# Patient Record
Sex: MALE | Race: BLACK OR AFRICAN AMERICAN | NOT HISPANIC OR LATINO | Employment: FULL TIME | URBAN - METROPOLITAN AREA
[De-identification: names, ages, dates, MRNs, and addresses within clinical notes are randomized per-mention and may not be internally consistent; named-entity substitution may affect disease eponyms.]

---

## 2024-02-08 ENCOUNTER — CONSULT (OUTPATIENT)
Dept: RADIATION ONCOLOGY | Facility: HOSPITAL | Age: 74
End: 2024-02-08
Payer: MEDICARE

## 2024-02-08 VITALS
OXYGEN SATURATION: 97 % | DIASTOLIC BLOOD PRESSURE: 79 MMHG | SYSTOLIC BLOOD PRESSURE: 140 MMHG | RESPIRATION RATE: 20 BRPM | HEART RATE: 78 BPM | WEIGHT: 176.59 LBS | TEMPERATURE: 98.5 F

## 2024-02-08 DIAGNOSIS — C61 PROSTATE CANCER: Primary | ICD-10-CM

## 2024-02-08 PROCEDURE — G0463 HOSPITAL OUTPT CLINIC VISIT: HCPCS | Performed by: RADIOLOGY

## 2024-02-08 NOTE — LETTER
February 9, 2024     Sherri Mehta MD  [address]  [place] [address]    Patient: Fausto Zaman   YOB: 1950   Date of Visit: 2/8/2024     Dear Sherri Mehta MD:       Thank you for referring Fausto Zaman to me for evaluation. Below are the relevant portions of my assessment and plan of care.  It seems as though the family is between watchful waiting and pursuing treatment.    If you have questions, please do not hesitate to call me. I look forward to following Mr. Zaman along with you.         Sincerely,        Aldo Mejia MD        CC: No Recipients    Aldo Mejia MD  02/09/24 0917  Sign when Signing Visit       New Patient Office Visit      Encounter Date: 02/08/2024   Patient Name: Fausto Zaman  YOB: 1950   Medical Record Number: [id number]   Primary Diagnosis: Prostate cancer [C61]         Chief Complaint:    Chief Complaint   Patient presents with   • Consult   • Prostate Cancer       History of Present Illness: Fausto Zaman is a 73-year-old gentleman with intermediate risk prostate cancer who is seen in consultation regarding radiotherapy as a component of definitive management.  Mr. Zaman was noted to have a elevated PSA and was recommended to undergo biopsy of the prostate.  Biopsy of the prostate performed on 12/18/2023 revealed prostatic adenocarcinoma, [place] score 3+4 = 7 in cores from the right mid, right lateral mid, right lateral base, left lateral apex, left lateral mid and left lateral base.  Cores from the left apex, right lateral apex and right base revealed adenocarcinoma Phyllis score 3+3 = 6.  Mr. Zaman reports good urinary function with some occasional nocturia x 3; AUA SS today is 9.  He reports normal bowel movements with no issues.    Subjective      AUA/IPSS: 9       Review of Systems: Review of Systems   Constitutional:  Negative for appetite change, fatigue and unexpected weight change.   HENT:  Negative  for hearing loss, sore throat, trouble swallowing and voice change.    Eyes:  Positive for visual disturbance (Ongoing, does not wear his glasses.).   Respiratory:  Negative for cough, chest tightness, shortness of breath and wheezing.    Cardiovascular:  Negative for chest pain, palpitations and leg swelling.   Gastrointestinal:  Positive for constipation (Occasionally, relieved with diet.). Negative for abdominal distention, abdominal pain, anal bleeding, blood in stool, diarrhea, nausea and vomiting.        Colonoscopy approximately 4 years ago at [facility].     Genitourinary:  Positive for frequency (Ongoing) and urgency (Ongoing). Negative for difficulty urinating, dysuria and hematuria.        Noc x3-4  Normal urinary stream     Musculoskeletal:  Positive for arthralgias (Occasional right shoulder pain.). Negative for back pain, gait problem and joint swelling.   Skin:  Negative for color change and rash.   Neurological:  Negative for dizziness, weakness and headaches.        States that he fell yesterday, no injury.     Psychiatric/Behavioral:  Negative for self-injury, sleep disturbance and suicidal ideas.        Past Medical History:   Past Medical History:   Diagnosis Date   • Prostate cancer        Past Surgical History:   Past Surgical History:   Procedure Laterality Date   • ABDOMINAL SURGERY      Patient had part of intestines removed in the 70's   • COLONOSCOPY      Approximately 4 years ago at [facility].   • HERNIA REPAIR      x2       Family History:   Family History   Problem Relation Age of Onset   • Cancer Paternal Uncle        Social History:   Social History     Socioeconomic History   • Marital status:    Tobacco Use   • Smoking status: Never   Substance and Sexual Activity   • Alcohol use: Never   • Drug use: Never       Medications:     Current Outpatient Medications:   •  DONEPEZIL HCL PO, Take 1 tablet by mouth Daily., Disp: , Rfl:   •  vitamin D3 125 MCG (5000  UT) capsule capsule, Take 2 capsules by mouth Daily., Disp: , Rfl:     Allergies:   No Known Allergies    Pain: (on a scale of 0-10)   Pain Score    02/08/24 1531   PainSc: 0-No pain       Fausto Zaman reports a pain score of 0.  Given his pain assessment as noted, treatment options were discussed and the following options were decided upon as a follow-up plan to address the patient's pain: continuation of current treatment plan for pain.     Advanced Care Plan: N   Quality of Life: 100 - Full Activity    Objective     Physical Exam:   Vital Signs:   Vitals:    02/08/24 1531   BP: 140/79   Pulse: 78   Resp: 20   Temp: 98.5 °F (36.9 °C)   TempSrc: Temporal   SpO2: 97%   Weight: 80.1 kg (176 lb 9.4 oz)   PainSc: 0-No pain     There is no height or weight on file to calculate BMI.     Physical Exam  Constitutional:       General: He is not in acute distress.     Appearance: He is not toxic-appearing.   HENT:      Head: Normocephalic and atraumatic.   Pulmonary:      Effort: Pulmonary effort is normal. No respiratory distress.   Skin:     General: Skin is warm and dry.      Coloration: Skin is not jaundiced.      Findings: No lesion.   Neurological:      General: No focal deficit present.      Mental Status: He is alert.      Cranial Nerves: No cranial nerve deficit.      Gait: Gait normal.   Psychiatric:         Mood and Affect: Mood normal.         Behavior: Behavior normal.         Judgment: Judgment normal.         Results:   Radiographs: I personally reviewed the results of the CT scan of the abdomen and pelvis performed on 2/2/2022 in addition to the bone scan performed on the same date.  This reveals no evidence of metastatic disease.    Pathology: I personally reviewed the pathology report from the procedure performed on 12/18/2023.  The pertinent findings are as above in HPI.        Assessment / Plan      Assessment/Plan:   Fausto Zaman is a 73-year-old gentleman with cT1c cN0c M0 adenocarcinoma the  prostate Phyllis 3+4 = 7, PSA 7.  He has no clinical or radiographic evidence of regional or distant metastatic disease.  ECOG 0    I discussed the clinical, radiographic and pathologic findings to date with Mr. Zaman and his family.  I explained the role of radiotherapy in the definitive management of intermediate risk prostate cancer, outlining the rationale for this approach.  I discussed the logistics, the risks, benefits and alternatives to external beam radiotherapy.  I did explain that in some cases of intermediate risk prostate cancer, stereotactic body radiotherapy can be delivered but this treatment should be reserved for patients with low-grade, early stage disease with excellent urinary function.  I offered moderately hypofractionated external beam radiotherapy and a total of 28 treatments, outlining the success rate and toxicity profile associated with this approach.  This would be delivered with androgen deprivation therapy as directed by Dr. Mehta.  I explained that hydrogel dissection with fiducial marker placement would be performed before initiating treatment.  We additionally discussed watchful waiting and active surveillance.  Mr. Zaman will consider his options and contact me regarding his decision.        Aldo Mejia MD  Radiation Oncology  [facility]    This document has been signed by Aldo Mejia MD on February 9, 2024 09:07 EST

## 2024-02-08 NOTE — PROGRESS NOTES
New Patient Office Visit      Encounter Date: 02/08/2024   Patient Name: Fausto Zaman  YOB: 1950   Medical Record Number: [id number]   Primary Diagnosis: Prostate cancer [C61]         Chief Complaint:    Chief Complaint   Patient presents with    Consult    Prostate Cancer       History of Present Illness: Fausto Zaman is a 73-year-old gentleman with intermediate risk prostate cancer who is seen in consultation regarding radiotherapy as a component of definitive management.  Mr. Zaman was noted to have a elevated PSA and was recommended to undergo biopsy of the prostate.  Biopsy of the prostate performed on 12/18/2023 revealed prostatic adenocarcinoma, [place] score 3+4 = 7 in cores from the right mid, right lateral mid, right lateral base, left lateral apex, left lateral mid and left lateral base.  Cores from the left apex, right lateral apex and right base revealed adenocarcinoma Phyllis score 3+3 = 6.  Mr. Zaman reports good urinary function with some occasional nocturia x 3; AUA SS today is 9.  He reports normal bowel movements with no issues.    Subjective      AUA/IPSS: 9       Review of Systems: Review of Systems   Constitutional:  Negative for appetite change, fatigue and unexpected weight change.   HENT:  Negative for hearing loss, sore throat, trouble swallowing and voice change.    Eyes:  Positive for visual disturbance (Ongoing, does not wear his glasses.).   Respiratory:  Negative for cough, chest tightness, shortness of breath and wheezing.    Cardiovascular:  Negative for chest pain, palpitations and leg swelling.   Gastrointestinal:  Positive for constipation (Occasionally, relieved with diet.). Negative for abdominal distention, abdominal pain, anal bleeding, blood in stool, diarrhea, nausea and vomiting.        Colonoscopy approximately 4 years ago at [facility].     Genitourinary:  Positive for frequency (Ongoing) and urgency (Ongoing). Negative for difficulty  urinating, dysuria and hematuria.        Noc x3-4  Normal urinary stream     Musculoskeletal:  Positive for arthralgias (Occasional right shoulder pain.). Negative for back pain, gait problem and joint swelling.   Skin:  Negative for color change and rash.   Neurological:  Negative for dizziness, weakness and headaches.        States that he fell yesterday, no injury.     Psychiatric/Behavioral:  Negative for self-injury, sleep disturbance and suicidal ideas.        Past Medical History:   Past Medical History:   Diagnosis Date    Prostate cancer        Past Surgical History:   Past Surgical History:   Procedure Laterality Date    ABDOMINAL SURGERY      Patient had part of intestines removed in the 70's    COLONOSCOPY      Approximately 4 years ago at [facility].    HERNIA REPAIR      x2       Family History:   Family History   Problem Relation Age of Onset    Cancer Paternal Uncle        Social History:   Social History     Socioeconomic History    Marital status:    Tobacco Use    Smoking status: Never   Substance and Sexual Activity    Alcohol use: Never    Drug use: Never       Medications:     Current Outpatient Medications:     DONEPEZIL HCL PO, Take 1 tablet by mouth Daily., Disp: , Rfl:     vitamin D3 125 MCG (5000 UT) capsule capsule, Take 2 capsules by mouth Daily., Disp: , Rfl:     Allergies:   No Known Allergies    Pain: (on a scale of 0-10)   Pain Score    02/08/24 1531   PainSc: 0-No pain       Fausto Zaman reports a pain score of 0.  Given his pain assessment as noted, treatment options were discussed and the following options were decided upon as a follow-up plan to address the patient's pain: continuation of current treatment plan for pain.     Advanced Care Plan: N   Quality of Life: 100 - Full Activity    Objective     Physical Exam:   Vital Signs:   Vitals:    02/08/24 1531   BP: 140/79   Pulse: 78   Resp: 20   Temp: 98.5 °F (36.9 °C)   TempSrc: Temporal   SpO2: 97%    Weight: 80.1 kg (176 lb 9.4 oz)   PainSc: 0-No pain     There is no height or weight on file to calculate BMI.     Physical Exam  Constitutional:       General: He is not in acute distress.     Appearance: He is not toxic-appearing.   HENT:      Head: Normocephalic and atraumatic.   Pulmonary:      Effort: Pulmonary effort is normal. No respiratory distress.   Skin:     General: Skin is warm and dry.      Coloration: Skin is not jaundiced.      Findings: No lesion.   Neurological:      General: No focal deficit present.      Mental Status: He is alert.      Cranial Nerves: No cranial nerve deficit.      Gait: Gait normal.   Psychiatric:         Mood and Affect: Mood normal.         Behavior: Behavior normal.         Judgment: Judgment normal.         Results:   Radiographs: I personally reviewed the results of the CT scan of the abdomen and pelvis performed on 2/2/2022 in addition to the bone scan performed on the same date.  This reveals no evidence of metastatic disease.    Pathology: I personally reviewed the pathology report from the procedure performed on 12/18/2023.  The pertinent findings are as above in HPI.        Assessment / Plan      Assessment/Plan:   Fausto Zaman is a 73-year-old gentleman with cT1c cN0c M0 adenocarcinoma the prostate Phyllis 3+4 = 7, PSA 7.  He has no clinical or radiographic evidence of regional or distant metastatic disease.  ECOG 0    I discussed the clinical, radiographic and pathologic findings to date with Mr. Zaman and his family.  I explained the role of radiotherapy in the definitive management of intermediate risk prostate cancer, outlining the rationale for this approach.  I discussed the logistics, the risks, benefits and alternatives to external beam radiotherapy.  I did explain that in some cases of intermediate risk prostate cancer, stereotactic body radiotherapy can be delivered but this treatment should be reserved for patients with low-grade, early stage  disease with excellent urinary function.  I offered moderately hypofractionated external beam radiotherapy and a total of 28 treatments, outlining the success rate and toxicity profile associated with this approach.  This would be delivered with androgen deprivation therapy as directed by Dr. Mehta.  I explained that hydrogel dissection with fiducial marker placement would be performed before initiating treatment.  We additionally discussed watchful waiting and active surveillance.  Mr. Zaman will consider his options and contact me regarding his decision.        Aldo Mejia MD  Radiation Oncology  [facility]    This document has been signed by Aldo Mejia MD on February 9, 2024 09:07 EST

## 2024-05-17 ENCOUNTER — OFFICE VISIT (OUTPATIENT)
Dept: RADIATION ONCOLOGY | Facility: HOSPITAL | Age: 74
End: 2024-05-17
Payer: MEDICARE

## 2024-05-17 VITALS
SYSTOLIC BLOOD PRESSURE: 139 MMHG | TEMPERATURE: 97.8 F | DIASTOLIC BLOOD PRESSURE: 94 MMHG | WEIGHT: 167.55 LBS | OXYGEN SATURATION: 100 % | RESPIRATION RATE: 16 BRPM | HEART RATE: 73 BPM

## 2024-05-17 DIAGNOSIS — C61 PROSTATE CANCER: Primary | ICD-10-CM

## 2024-05-17 PROCEDURE — G0463 HOSPITAL OUTPT CLINIC VISIT: HCPCS | Performed by: RADIOLOGY

## 2024-05-17 NOTE — PROGRESS NOTES
Follow Up Office Visit      Encounter Date: 05/17/2024   Patient Name: Fausto Zaman  YOB: 1950   Medical Record Number: [id number]   Primary Diagnosis: Prostate cancer [C61]           Chief Complaint:    Chief Complaint   Patient presents with    Follow-up    Prostate Cancer       History of Present Illness: Fausto Zaman returns for evaluation regarding treatment for prostate cancer.  He has decided to undergo external beam radiotherapy.  He reports no changes since last evaluated.    Subjective      Review of Systems: Review of Systems   Constitutional:  Negative for appetite change and fatigue.   Respiratory:  Negative for cough and shortness of breath.    Gastrointestinal:  Positive for constipation (occasional, controlled with diet). Negative for blood in stool, diarrhea, nausea and rectal pain.        Colonoscopy 2019     Genitourinary:  Positive for frequency (occasional) and urgency (occasional). Negative for difficulty urinating, dysuria and hematuria.        Nocturia x 1   Musculoskeletal:  Negative for arthralgias and back pain.   Neurological:  Negative for dizziness and headaches.   Psychiatric/Behavioral:  Negative for sleep disturbance (just occasional).        The following portions of the patient's history were reviewed and updated as appropriate: allergies, current medications, past family history, past medical history, past social history, past surgical history and problem list.    Medications:     Current Outpatient Medications:     DONEPEZIL HCL PO, Take 1 tablet by mouth Daily., Disp: , Rfl:     vitamin D3 125 MCG (5000 UT) capsule capsule, Take 2 capsules by mouth Daily., Disp: , Rfl:     Allergies:   No Known Allergies    ECOG: (0) Fully active, able to carry on all predisease performance without restriction  Quality of Life: 100 - Full Activity     Objective     Physical Exam:   Vital Signs:   Vitals:    05/17/24 1537   BP: 139/94   Pulse: 73   Resp: 16   Temp:  97.8 °F (36.6 °C)   TempSrc: Temporal   SpO2: 100%   Weight: 76 kg (167 lb 8.8 oz)   PainSc: 0-No pain     There is no height or weight on file to calculate BMI.     Physical Exam  Constitutional:       General: He is not in acute distress.     Appearance: He is normal weight. He is not toxic-appearing.   HENT:      Head: Normocephalic and atraumatic.   Pulmonary:      Effort: Pulmonary effort is normal. No respiratory distress.   Skin:     General: Skin is warm and dry.   Neurological:      General: No focal deficit present.      Mental Status: He is alert.      Cranial Nerves: No cranial nerve deficit.      Gait: Gait normal.   Psychiatric:         Mood and Affect: Mood normal.         Behavior: Behavior normal.         Judgment: Judgment normal.       Fausto Zaman reports a pain score of 0.  Given his pain assessment as noted, treatment options were discussed and the following options were decided upon as a follow-up plan to address the patient's pain: continuation of current treatment plan for pain.       Labs: Recent PSA is 9 ng/mL    Assessment / Plan      Assessment/Plan:   Fausto Zaman is a 73-year-old gentleman with cT1c cN0c M0 adenocarcinoma the prostate Phyllis 3+4 = 7, PSA 7. He has no clinical or radiographic evidence of regional or distant metastatic disease. ECOG 0    We discussed the logistics regarding the delivery of external beam radiotherapy.  We additionally discussed the expected toxicity profile of external beam radiotherapy.  Mr. Zaman is agreeable to hydrogel dissection with fiducial marker placement before undergoing external beam radiotherapy.        Aldo Mejia MD  Radiation Oncology  [facility]    This document has been signed by Aldo Mejia MD on May 17, 2024 16:10 EDT

## 2024-05-30 ENCOUNTER — PREP FOR SURGERY (OUTPATIENT)
Dept: OTHER | Facility: HOSPITAL | Age: 74
End: 2024-05-30
Payer: MEDICARE

## 2024-05-30 DIAGNOSIS — C61 PROSTATE CANCER: Primary | ICD-10-CM

## 2024-06-03 ENCOUNTER — HOSPITAL ENCOUNTER (OUTPATIENT)
Dept: RADIATION ONCOLOGY | Facility: HOSPITAL | Age: 74
Setting detail: RADIATION/ONCOLOGY SERIES
End: 2024-06-03
Payer: MEDICARE

## 2024-06-06 ENCOUNTER — EDUCATION (OUTPATIENT)
Dept: RADIATION ONCOLOGY | Facility: HOSPITAL | Age: 74
End: 2024-06-06
Payer: MEDICARE

## 2024-06-06 RX ORDER — CIPROFLOXACIN 500 MG/1
500 TABLET, FILM COATED ORAL 2 TIMES DAILY
Qty: 6 TABLET | Refills: 0 | Status: SHIPPED | OUTPATIENT
Start: 2024-06-11

## 2024-06-06 NOTE — PROGRESS NOTES
Mr. Zaman was set up for education for his upcoming procedure with Dr. Mejia.  Patient is to be scheduled for hydrogel dissection with fiducial marker placement.  I spoke to patients daughter, Jennifer and she requested that education be sent to her via email  Email sent to Jennifer, May 31st 2024.  Written instructions were given to patient and family regarding prep for procedure and post op care.  I contacted Jennifer today, she confirms that she received the email and denies any questions or concerns.  Instructed to reach out to our office for any questions or concerns.  Prescription for Cipro called into patients preferred pharmacy.

## 2024-06-07 RX ORDER — MEMANTINE HYDROCHLORIDE 10 MG/1
1 TABLET ORAL DAILY
COMMUNITY
Start: 2024-04-26

## 2024-06-07 NOTE — PRE-PROCEDURE INSTRUCTIONS
PATIENT INSTRUCTED TO BE:    - NPO AFTER MIDNIGHT EXCEPT CAN HAVE CLEAR LIQUIDS 2 HOURS PRIOR TO SURGERY ARRIVAL TIME     - TO HOLD ALL VITAMINS, SUPPLEMENTS, NSAIDS FOR ONE WEEK PRIOR TO THEIR SURGICAL PROCEDURE    - INSTRUCTED PT TO USE SURGICAL SOAP 1 TIME THE NIGHT PRIOR TO SURGERY OR THE AM OF SURGERY.   USE SOAP FROM NECK TO TOES AVOID THEIR FACE, HAIR, AND PRIVATE PARTS. INSTRUCTED NO LOTIONS, JEWELRY, PIERCINGS, OR DEODORANT DAY OF SURGERY        - INSTRUCTED TO TAKE THE FOLLOWING MEDICATIONS THE DAY OF SURGERY:   Cipro, Memantine    PATIENT VERBALIZED UNDERSTANDING

## 2024-06-12 ENCOUNTER — ANESTHESIA (OUTPATIENT)
Dept: PERIOP | Facility: HOSPITAL | Age: 74
End: 2024-06-12
Payer: MEDICARE

## 2024-06-12 ENCOUNTER — HOSPITAL ENCOUNTER (OUTPATIENT)
Facility: HOSPITAL | Age: 74
Setting detail: HOSPITAL OUTPATIENT SURGERY
Discharge: HOME | End: 2024-06-12
Attending: RADIOLOGY | Admitting: RADIOLOGY
Payer: MEDICARE

## 2024-06-12 ENCOUNTER — ANESTHESIA EVENT (OUTPATIENT)
Dept: PERIOP | Facility: HOSPITAL | Age: 74
End: 2024-06-12
Payer: MEDICARE

## 2024-06-12 VITALS
OXYGEN SATURATION: 97 % | DIASTOLIC BLOOD PRESSURE: 99 MMHG | HEIGHT: 72 IN | SYSTOLIC BLOOD PRESSURE: 165 MMHG | TEMPERATURE: 98.1 F | WEIGHT: 166.89 LBS | BODY MASS INDEX: 22.6 KG/M2 | HEART RATE: 67 BPM | RESPIRATION RATE: 18 BRPM

## 2024-06-12 PROCEDURE — 25010000002 ONDANSETRON PER 1 MG: Performed by: NURSE ANESTHETIST, CERTIFIED REGISTERED

## 2024-06-12 PROCEDURE — 25810000003 LACTATED RINGERS PER 1000 ML: Performed by: ANESTHESIOLOGY

## 2024-06-12 PROCEDURE — 25010000002 PROPOFOL 10 MG/ML EMULSION: Performed by: NURSE ANESTHETIST, CERTIFIED REGISTERED

## 2024-06-12 PROCEDURE — A4648 IMPLANTABLE TISSUE MARKER: HCPCS | Performed by: RADIOLOGY

## 2024-06-12 PROCEDURE — C1889 IMPLANT/INSERT DEVICE, NOC: HCPCS | Performed by: RADIOLOGY

## 2024-06-12 PROCEDURE — 25010000002 DEXAMETHASONE PER 1 MG: Performed by: NURSE ANESTHETIST, CERTIFIED REGISTERED

## 2024-06-12 PROCEDURE — 25010000002 FENTANYL CITRATE (PF) 50 MCG/ML SOLUTION: Performed by: NURSE ANESTHETIST, CERTIFIED REGISTERED

## 2024-06-12 DEVICE — INJ GEL PROST/RECTL/SPACR BARRIGEL SYR 3ML: Type: IMPLANTABLE DEVICE | Site: RECTUM | Status: FUNCTIONAL

## 2024-06-12 DEVICE — MARKR FIDUCL KNURL 20CM/NDL 18G 1.0MM GLD: Type: IMPLANTABLE DEVICE | Site: RECTUM | Status: FUNCTIONAL

## 2024-06-12 RX ORDER — OXYCODONE HYDROCHLORIDE 5 MG/1
5 TABLET ORAL
Status: DISCONTINUED | OUTPATIENT
Start: 2024-06-12 | End: 2024-06-12 | Stop reason: HOSPADM

## 2024-06-12 RX ORDER — SODIUM CHLORIDE, SODIUM LACTATE, POTASSIUM CHLORIDE, CALCIUM CHLORIDE 600; 310; 30; 20 MG/100ML; MG/100ML; MG/100ML; MG/100ML
9 INJECTION, SOLUTION INTRAVENOUS CONTINUOUS PRN
Status: DISCONTINUED | OUTPATIENT
Start: 2024-06-12 | End: 2024-06-12 | Stop reason: HOSPADM

## 2024-06-12 RX ORDER — ACETAMINOPHEN 500 MG
1000 TABLET ORAL ONCE
Status: COMPLETED | OUTPATIENT
Start: 2024-06-12 | End: 2024-06-12

## 2024-06-12 RX ORDER — PROPOFOL 10 MG/ML
VIAL (ML) INTRAVENOUS AS NEEDED
Status: DISCONTINUED | OUTPATIENT
Start: 2024-06-12 | End: 2024-06-12 | Stop reason: SURG

## 2024-06-12 RX ORDER — ONDANSETRON 2 MG/ML
INJECTION INTRAMUSCULAR; INTRAVENOUS AS NEEDED
Status: DISCONTINUED | OUTPATIENT
Start: 2024-06-12 | End: 2024-06-12 | Stop reason: SURG

## 2024-06-12 RX ORDER — PROMETHAZINE HYDROCHLORIDE 12.5 MG/1
25 TABLET ORAL ONCE AS NEEDED
Status: DISCONTINUED | OUTPATIENT
Start: 2024-06-12 | End: 2024-06-12 | Stop reason: HOSPADM

## 2024-06-12 RX ORDER — ONDANSETRON 2 MG/ML
4 INJECTION INTRAMUSCULAR; INTRAVENOUS ONCE AS NEEDED
Status: DISCONTINUED | OUTPATIENT
Start: 2024-06-12 | End: 2024-06-12 | Stop reason: HOSPADM

## 2024-06-12 RX ORDER — DEXAMETHASONE SODIUM PHOSPHATE 4 MG/ML
INJECTION, SOLUTION INTRA-ARTICULAR; INTRALESIONAL; INTRAMUSCULAR; INTRAVENOUS; SOFT TISSUE AS NEEDED
Status: DISCONTINUED | OUTPATIENT
Start: 2024-06-12 | End: 2024-06-12 | Stop reason: SURG

## 2024-06-12 RX ORDER — FENTANYL CITRATE 50 UG/ML
INJECTION, SOLUTION INTRAMUSCULAR; INTRAVENOUS AS NEEDED
Status: DISCONTINUED | OUTPATIENT
Start: 2024-06-12 | End: 2024-06-12 | Stop reason: SURG

## 2024-06-12 RX ORDER — PROMETHAZINE HYDROCHLORIDE 25 MG/1
25 SUPPOSITORY RECTAL ONCE AS NEEDED
Status: DISCONTINUED | OUTPATIENT
Start: 2024-06-12 | End: 2024-06-12 | Stop reason: HOSPADM

## 2024-06-12 RX ORDER — LIDOCAINE HYDROCHLORIDE 20 MG/ML
INJECTION, SOLUTION EPIDURAL; INFILTRATION; INTRACAUDAL; PERINEURAL AS NEEDED
Status: DISCONTINUED | OUTPATIENT
Start: 2024-06-12 | End: 2024-06-12 | Stop reason: SURG

## 2024-06-12 RX ADMIN — ACETAMINOPHEN 1000 MG: 500 TABLET ORAL at 13:08

## 2024-06-12 RX ADMIN — SODIUM CHLORIDE, POTASSIUM CHLORIDE, SODIUM LACTATE AND CALCIUM CHLORIDE 9 ML/HR: 600; 310; 30; 20 INJECTION, SOLUTION INTRAVENOUS at 13:08

## 2024-06-12 RX ADMIN — LIDOCAINE HYDROCHLORIDE 100 MG: 20 INJECTION, SOLUTION INTRAVENOUS at 14:26

## 2024-06-12 RX ADMIN — PROPOFOL 100 MG: 10 INJECTION, EMULSION INTRAVENOUS at 14:26

## 2024-06-12 RX ADMIN — FENTANYL CITRATE 50 MCG: 50 INJECTION, SOLUTION INTRAMUSCULAR; INTRAVENOUS at 14:25

## 2024-06-12 RX ADMIN — PROPOFOL 100 MG: 10 INJECTION, EMULSION INTRAVENOUS at 14:28

## 2024-06-12 RX ADMIN — DEXAMETHASONE SODIUM PHOSPHATE 4 MG: 4 INJECTION, SOLUTION INTRAMUSCULAR; INTRAVENOUS at 14:25

## 2024-06-12 RX ADMIN — ONDANSETRON HYDROCHLORIDE 4 MG: 2 SOLUTION INTRAMUSCULAR; INTRAVENOUS at 14:25

## 2024-06-12 NOTE — OP NOTE
Barrigel Procedure Note  Procedure Date: 6/12/2024      Pre/Post Diagnosis: C61 Malignant Neoplasm of Prostate    Rationale/Reason for Procedure: organ at risk protection for radiation therapy delivery      Procedure:  The rectum was voided prior to initiation of the procedure.     The patient was positioned in the dorsal lithotomy position. The transrectal ultrasound probe was positioned to enable guidance of the needle into the space between the prostate and the rectum. Three gold fiducial markers were placed in to the prostate gland. Under transrectal ultrasound guidance, a 30 cm 18-gauge needle was inserted through the rectourethralis muscle and the needle tip advanced into the perirectal fat posterior to the prostate all by using a transperineal approach and with side-fire transrectal ultrasound guidance.  The needle position was confirmed in both the sagittal and axial planes. With the needle tip at mid gland the axial plane was reviewed to confirm the needle was not in the rectal wall (movement of the needle tip without corresponding movement of the rectal wall confirmed perirectal placement). The assembled Barrigel delivery system was then attached to the 18-gauge needle.  Under ultrasound guidance (sagittal plan), a smooth, continuous injection technique was used to dispense the Barrigel hydrogel into the space between the prostate and the rectum (Denonvilliers' fascia and the anterior rectal wall).  A total of three syringes were employed. Optimal visualization of the needle during hydrogel administration was maintained at all times.                        Complications:     No suspected penetration or compromise of the rectal wall occurred.    Aldo Mejia MD / 6/12/2024 / 15:03 EDT  Radiation Oncologist Signature / Date / Time

## 2024-06-12 NOTE — H&P
Radiation Oncology Inpatient Consult       Patient: Fausto Zaman   YOB: 1950   Medical Record Number: [id number]      Date of Consult:  6/12/2024  Primary Diagnosis:  prostate cancer  Cancer Staging: Cancer Staging   No matching staging information was found for the patient.                                                 History of Present Illness:Mr. Zaman was noted to have a elevated PSA and was recommended to undergo biopsy of the prostate.  Biopsy of the prostate performed on 12/18/2023 revealed prostatic adenocarcinoma, Phyllis score 3+4 = 7 in cores from the right mid, right lateral mid, right lateral base, left lateral apex, left lateral mid and left lateral base.  Cores from the left apex, right lateral apex and right base revealed adenocarcinoma [place] score 3+3 = 6.  Mr. Zaman reports good urinary function with some occasional nocturia x 3; AUA SS today is 9.  He reports normal bowel movements with no issues.     Subjective       Review of Systems: Review of Systems    Past Medical History:   Diagnosis Date    Memory loss     at times    Prostate cancer         Past Surgical History:   Procedure Laterality Date    ABDOMINAL SURGERY      Patient had part of intestines removed in the 70's, food poisoning    COLONOSCOPY      Approximately 4 years ago at [facility].    HERNIA REPAIR Bilateral     x2 inguinal      Family History   Problem Relation Age of Onset    Cancer Paternal Uncle     Malig Hyperthermia Neg Hx         Social History     Tobacco Use    Smoking status: Never    Smokeless tobacco: Never   Vaping Use    Vaping status: Never Used   Substance Use Topics    Alcohol use: Never    Drug use: Never        Allergies:Patient has no known allergies.     Current Facility-Administered Medications:     lactated ringers infusion, 9 mL/hr, Intravenous, Continuous PRN, Eder Rivas MD, Last Rate: 9 mL/hr at 06/12/24 1308, 9 mL/hr at 06/12/24 1308   Pain: There  "were no vitals filed for this visit.    [unfilled]     ECOG Performance Status:   Quality of Life:  100 - Full Activity  Performance Status:  (1) Restricted in Physically Strenuous Activity, Ambulatory & Able to Do Work of Light Nature          Physical Examination:  Vitals:   [unfilled]    Height: 182.9 cm (72\")  Weight:       06/07/24  1107 06/12/24  1216   Weight: 75.8 kg (167 lb) 75.7 kg (166 lb 14.2 oz)        Constitutional: The patient is a well-developed, well-nourished male  in no acute distress.  Alert and oriented ×3.  Eyes: PERRLA.  EOMI.  ENMT:  Ears and nose WNL.  No lesions noted in the oral cavity or oropharynx.    Respiratory: Normal respiratory effort  Extremities: No clubbing, cyanosis, or edema.  Neurologic: Cranial nerves II through XII are grossly intact, with no focal neurological deficits noted on exam.  Psychiatric: Alert and oriented x3. Normal affect, with no anxiety or depression noted.      ASSESSMENT/PLAN: Fausto Zaman is a 73-year-old gentleman with cT1c cN0c M0 adenocarcinoma the prostate [place] 3+4 = 7, PSA 7. He has no clinical or radiographic evidence of regional or distant metastatic disease. ECOG 0     He is agreeable to hydrogel dissection and fiducial marker placement today, understanding the risks, potential benefits and alternatives to this procedure.    Electronically signed by Aldo Mejia MD, 06/12/24, 2:07 PM EDT.    "

## 2024-06-12 NOTE — ANESTHESIA PREPROCEDURE EVALUATION
Anesthesia Evaluation     Patient summary reviewed and Nursing notes reviewed   no history of anesthetic complications:   NPO Solid Status: > 8 hours  NPO Liquid Status: > 2 hours           Airway   Mallampati: II  TM distance: >3 FB  Neck ROM: full  No difficulty expected  Dental      Pulmonary - negative pulmonary ROS and normal exam    breath sounds clear to auscultation  Cardiovascular - normal exam  Exercise tolerance: poor (<4 METS)    Rhythm: regular  Rate: normal        Neuro/Psych  (+) dementia, poor historian.  GI/Hepatic/Renal/Endo - negative ROS     Musculoskeletal     Abdominal    Substance History      OB/GYN          Other        ROS/Med Hx Other: PAT Nursing Notes unavailable.               Anesthesia Plan    ASA 2     general     (Patient understands anesthesia not responsible for dental damage.)  intravenous induction     Anesthetic plan, risks, benefits, and alternatives have been provided, discussed and informed consent has been obtained with: patient, spouse/significant other and child.    Plan discussed with CRNA.    CODE STATUS:

## 2024-06-12 NOTE — DISCHARGE INSTRUCTIONS
Space [facility] Placement: Instructions for After Care    Do not drive or sign any legal documents for the next 24 hours  Do not ride or operate any heavy machinery (motorcycles, horses, lawnmower, etc.) in the next 72 hours  Light bleeding, like spotting may be noticed from the perineum area for up to 24 hours after the procedure.  Eat light foods that do not cause GI upset (nausea, diarrhea) for the next 72 hours  Complete antibiotics as prescribed.       Contact the office if having any difficulty with urination, blood in stool/urine or any signs or infection (fever).      Nursing office Monday- Friday 8am-4pm:   [phone]  If after hours please contact PCP or go to the nearest ER or Urgent Care

## 2024-06-12 NOTE — ANESTHESIA POSTPROCEDURE EVALUATION
Patient: Fausto Zaman    Procedure Summary       Date: 06/12/24 Room / Location: [facility]  /  [facility]    Anesthesia Start: 1419 Anesthesia Stop: 1504    Procedure: PERIRECTAL SPACER APPLICATION FOR RADIATION TREATMENT (Perineum) Diagnosis:       Prostate cancer      (Prostate cancer [C61])    Surgeons: Aldo Mejia MD Provider: Eder Rivas MD    Anesthesia Type: general ASA Status: 2            Anesthesia Type: general    Vitals  Vitals Value Taken Time   /97 06/12/24 1507   Temp 36.8 °C (98.3 °F) 06/12/24 1502   Pulse 61 06/12/24 1511   Resp 18 06/12/24 1507   SpO2 100 % 06/12/24 1511   Vitals shown include unfiled device data.        Post Anesthesia Care and Evaluation    Patient location during evaluation: bedside  Patient participation: complete - patient participated  Level of consciousness: awake  Pain management: adequate    Airway patency: patent  PONV Status: none  Cardiovascular status: acceptable and stable  Respiratory status: acceptable  Hydration status: acceptable

## 2024-06-17 ENCOUNTER — EDUCATION (OUTPATIENT)
Dept: RADIATION ONCOLOGY | Facility: HOSPITAL | Age: 74
End: 2024-06-17
Payer: MEDICARE

## 2024-06-17 ENCOUNTER — HOSPITAL ENCOUNTER (OUTPATIENT)
Dept: MRI IMAGING | Facility: HOSPITAL | Age: 74
Discharge: HOME | End: 2024-06-17
Admitting: RADIOLOGY
Payer: MEDICARE

## 2024-06-17 ENCOUNTER — HOSPITAL ENCOUNTER (OUTPATIENT)
Dept: RADIATION ONCOLOGY | Facility: HOSPITAL | Age: 74
Setting detail: RADIATION/ONCOLOGY SERIES
Discharge: HOME | End: 2024-06-17
Payer: MEDICARE

## 2024-06-17 DIAGNOSIS — C61 PROSTATE CANCER: ICD-10-CM

## 2024-06-17 DIAGNOSIS — C61 MALIGNANT NEOPLASM OF PROSTATE: ICD-10-CM

## 2024-06-17 LAB
CREAT BLDA-MCNC: 1.2 MG/DL (ref 0.6–1.3)
EGFRCR SERPLBLD CKD-EPI 2021: 63.1 ML/MIN/1.73

## 2024-06-17 PROCEDURE — 82565 ASSAY OF CREATININE: CPT

## 2024-06-17 PROCEDURE — 0 GADOBENATE DIMEGLUMINE 529 MG/ML SOLUTION: Performed by: RADIOLOGY

## 2024-06-17 PROCEDURE — A9577 INJ MULTIHANCE: HCPCS | Performed by: RADIOLOGY

## 2024-06-17 PROCEDURE — 77263 THER RADIOLOGY TX PLNG CPLX: CPT | Performed by: RADIOLOGY

## 2024-06-17 RX ADMIN — GADOBENATE DIMEGLUMINE 15 ML: 529 INJECTION, SOLUTION INTRAVENOUS at 15:02

## 2024-06-17 NOTE — PROGRESS NOTES
"PATIENT NAME: Fausto Zaman    MRN: [id number]    DX: Prostate    CURRENT FRACTIONS AT TEACHING: at [facility]    EDUCATION GIVEN:    Patient education performed today in clinic.  Education folder with handouts given:    --- Radiation Therapy contact numbers for Main Line, Nurse Line, Treatment area,  and Dietician with instruction on when to use each one.   --- Radiation Therapy packet with site specific information.   --- Radiation Therapy bulleted points  --- Radiation Oncology skin care  --- Fatigue/Energy Conservation Technique Guidelines  --- Short and Long Term Symptoms Management \"Bubble\" Sheet (Prostate)    Discussed all handouts. Time given for patient to ask questions.  All questions answered to patient satisfaction.  No further needs or concerns at this time.     OTHER: n/a  "

## 2024-06-20 ENCOUNTER — HOSPITAL ENCOUNTER (OUTPATIENT)
Dept: RADIATION ONCOLOGY | Facility: HOSPITAL | Age: 74
Discharge: HOME | End: 2024-06-20

## 2024-06-23 PROCEDURE — 77301 RADIOTHERAPY DOSE PLAN IMRT: CPT | Performed by: RADIOLOGY

## 2024-06-23 PROCEDURE — 77338 DESIGN MLC DEVICE FOR IMRT: CPT | Performed by: RADIOLOGY

## 2024-06-23 PROCEDURE — 77300 RADIATION THERAPY DOSE PLAN: CPT | Performed by: RADIOLOGY

## 2024-06-24 ENCOUNTER — HOSPITAL ENCOUNTER (OUTPATIENT)
Dept: RADIATION ONCOLOGY | Facility: HOSPITAL | Age: 74
Discharge: HOME | End: 2024-06-24
Payer: MEDICARE

## 2024-06-24 LAB
RAD ONC ARIA COURSE ID: NORMAL
RAD ONC ARIA COURSE INTENT: NORMAL
RAD ONC ARIA COURSE LAST TREATMENT DATE: NORMAL
RAD ONC ARIA COURSE START DATE: NORMAL
RAD ONC ARIA COURSE TREATMENT ELAPSED DAYS: 0
RAD ONC ARIA FIRST TREATMENT DATE: NORMAL
RAD ONC ARIA PLAN FRACTIONS TREATED TO DATE: 1
RAD ONC ARIA PLAN ID: NORMAL
RAD ONC ARIA PLAN PRESCRIBED DOSE PER FRACTION: 2.5 GY
RAD ONC ARIA PLAN PRIMARY REFERENCE POINT: NORMAL
RAD ONC ARIA PLAN TOTAL FRACTIONS PRESCRIBED: 28
RAD ONC ARIA PLAN TOTAL PRESCRIBED DOSE: 7000 CGY
RAD ONC ARIA REFERENCE POINT DOSAGE GIVEN TO DATE: 2.5 GY
RAD ONC ARIA REFERENCE POINT ID: NORMAL
RAD ONC ARIA REFERENCE POINT SESSION DOSAGE GIVEN: 2.5 GY

## 2024-06-24 PROCEDURE — 77385: CPT | Performed by: RADIOLOGY

## 2024-06-24 PROCEDURE — 77014 CHG CT GUIDANCE RADIATION THERAPY FLDS PLACEMENT: CPT | Performed by: RADIOLOGY

## 2024-06-25 ENCOUNTER — HOSPITAL ENCOUNTER (OUTPATIENT)
Dept: RADIATION ONCOLOGY | Facility: HOSPITAL | Age: 74
Discharge: HOME | End: 2024-06-25

## 2024-06-25 VITALS
WEIGHT: 171.52 LBS | RESPIRATION RATE: 18 BRPM | BODY MASS INDEX: 23.26 KG/M2 | OXYGEN SATURATION: 98 % | SYSTOLIC BLOOD PRESSURE: 157 MMHG | HEART RATE: 64 BPM | TEMPERATURE: 97 F | DIASTOLIC BLOOD PRESSURE: 98 MMHG

## 2024-06-25 DIAGNOSIS — C61 MALIGNANT NEOPLASM OF PROSTATE: Primary | ICD-10-CM

## 2024-06-25 LAB
RAD ONC ARIA COURSE ID: NORMAL
RAD ONC ARIA COURSE INTENT: NORMAL
RAD ONC ARIA COURSE LAST TREATMENT DATE: NORMAL
RAD ONC ARIA COURSE START DATE: NORMAL
RAD ONC ARIA COURSE TREATMENT ELAPSED DAYS: 1
RAD ONC ARIA FIRST TREATMENT DATE: NORMAL
RAD ONC ARIA PLAN FRACTIONS TREATED TO DATE: 2
RAD ONC ARIA PLAN ID: NORMAL
RAD ONC ARIA PLAN PRESCRIBED DOSE PER FRACTION: 2.5 GY
RAD ONC ARIA PLAN PRIMARY REFERENCE POINT: NORMAL
RAD ONC ARIA PLAN TOTAL FRACTIONS PRESCRIBED: 28
RAD ONC ARIA PLAN TOTAL PRESCRIBED DOSE: 7000 CGY
RAD ONC ARIA REFERENCE POINT DOSAGE GIVEN TO DATE: 5 GY
RAD ONC ARIA REFERENCE POINT ID: NORMAL
RAD ONC ARIA REFERENCE POINT SESSION DOSAGE GIVEN: 2.5 GY

## 2024-06-25 PROCEDURE — 77014 CHG CT GUIDANCE RADIATION THERAPY FLDS PLACEMENT: CPT | Performed by: RADIOLOGY

## 2024-06-25 PROCEDURE — 77385: CPT | Performed by: RADIOLOGY

## 2024-06-25 NOTE — PROGRESS NOTES
On Treatment Visit       Patient: Fausto Zaman   YOB: 1950   Medical Record Number: [id number]     Date of Visit  June 25, 2024   Primary Diagnosis:Malignant neoplasm of prostate [C61]           was seen today for an on treatment visit.  He is receiving radiation therapy to the pelvis/prostate. He  has received 500 cGy in 2 fractions out of a planned dose of 7000 cGy in 28 fractions.     Today on exam the patient is tolerating radiation therapy well and has no new disease or treatment-related complaints.                                           Review of Systems:   Review of Systems   Constitutional:  Positive for fatigue (4/10). Negative for appetite change.   Respiratory:  Negative for cough and shortness of breath.    Gastrointestinal:  Positive for constipation. Negative for diarrhea and nausea.   Genitourinary:  Positive for frequency and urgency. Negative for difficulty urinating and dysuria.        Nocturia x 0   Musculoskeletal:  Negative for arthralgias and back pain.   Skin:  Negative for rash.   Neurological:  Negative for dizziness and headaches.   Psychiatric/Behavioral:  Negative for sleep disturbance.        Vitals:     Vitals:    06/25/24 1108   BP: 157/98   Pulse: 64   Resp: 18   Temp: 97 °F (36.1 °C)   SpO2: 98%       Weight:   Wt Readings from Last 3 Encounters:   06/25/24 77.8 kg (171 lb 8.3 oz)   06/12/24 75.7 kg (166 lb 14.2 oz)   05/17/24 76 kg (167 lb 8.8 oz)      Pain:    Pain Score    06/25/24 1108   PainSc: 0-No pain         Physical Exam:  Gen: WD/WN; NAD  HEENT: MMM  Trachea: midline  Chest: symmetric  Resp: normal respiratory effort  Extr: warm, well-perfused  Neuro: awake and alert; no aphasia or neglect    Plan: I have reviewed treatment setup notes, checked and approved the daily guidance images.  I reviewed dose delivery, treatment parameters and deemed them appropriate. We plan to continue radiation therapy as prescribed.          Radiation  Oncology    Electronically signed by Aldo Mejia MD 6/25/2024  11:38 EDT

## 2024-06-26 ENCOUNTER — HOSPITAL ENCOUNTER (OUTPATIENT)
Dept: RADIATION ONCOLOGY | Facility: HOSPITAL | Age: 74
Discharge: HOME | End: 2024-06-26

## 2024-06-26 LAB
RAD ONC ARIA COURSE ID: NORMAL
RAD ONC ARIA COURSE INTENT: NORMAL
RAD ONC ARIA COURSE LAST TREATMENT DATE: NORMAL
RAD ONC ARIA COURSE START DATE: NORMAL
RAD ONC ARIA COURSE TREATMENT ELAPSED DAYS: 2
RAD ONC ARIA FIRST TREATMENT DATE: NORMAL
RAD ONC ARIA PLAN FRACTIONS TREATED TO DATE: 3
RAD ONC ARIA PLAN ID: NORMAL
RAD ONC ARIA PLAN PRESCRIBED DOSE PER FRACTION: 2.5 GY
RAD ONC ARIA PLAN PRIMARY REFERENCE POINT: NORMAL
RAD ONC ARIA PLAN TOTAL FRACTIONS PRESCRIBED: 28
RAD ONC ARIA PLAN TOTAL PRESCRIBED DOSE: 7000 CGY
RAD ONC ARIA REFERENCE POINT DOSAGE GIVEN TO DATE: 7.5 GY
RAD ONC ARIA REFERENCE POINT ID: NORMAL
RAD ONC ARIA REFERENCE POINT SESSION DOSAGE GIVEN: 2.5 GY

## 2024-06-26 PROCEDURE — 77385: CPT | Performed by: RADIOLOGY

## 2024-06-27 ENCOUNTER — HOSPITAL ENCOUNTER (OUTPATIENT)
Dept: RADIATION ONCOLOGY | Facility: HOSPITAL | Age: 74
Discharge: HOME | End: 2024-06-27

## 2024-06-27 LAB
RAD ONC ARIA COURSE ID: NORMAL
RAD ONC ARIA COURSE INTENT: NORMAL
RAD ONC ARIA COURSE LAST TREATMENT DATE: NORMAL
RAD ONC ARIA COURSE START DATE: NORMAL
RAD ONC ARIA COURSE TREATMENT ELAPSED DAYS: 3
RAD ONC ARIA FIRST TREATMENT DATE: NORMAL
RAD ONC ARIA PLAN FRACTIONS TREATED TO DATE: 4
RAD ONC ARIA PLAN ID: NORMAL
RAD ONC ARIA PLAN PRESCRIBED DOSE PER FRACTION: 2.5 GY
RAD ONC ARIA PLAN PRIMARY REFERENCE POINT: NORMAL
RAD ONC ARIA PLAN TOTAL FRACTIONS PRESCRIBED: 28
RAD ONC ARIA PLAN TOTAL PRESCRIBED DOSE: 7000 CGY
RAD ONC ARIA REFERENCE POINT DOSAGE GIVEN TO DATE: 10 GY
RAD ONC ARIA REFERENCE POINT ID: NORMAL
RAD ONC ARIA REFERENCE POINT SESSION DOSAGE GIVEN: 2.5 GY

## 2024-06-27 PROCEDURE — 77385: CPT | Performed by: RADIOLOGY

## 2024-06-28 ENCOUNTER — HOSPITAL ENCOUNTER (OUTPATIENT)
Dept: RADIATION ONCOLOGY | Facility: HOSPITAL | Age: 74
Discharge: HOME | End: 2024-06-28

## 2024-06-28 LAB
RAD ONC ARIA COURSE ID: NORMAL
RAD ONC ARIA COURSE INTENT: NORMAL
RAD ONC ARIA COURSE LAST TREATMENT DATE: NORMAL
RAD ONC ARIA COURSE START DATE: NORMAL
RAD ONC ARIA COURSE TREATMENT ELAPSED DAYS: 4
RAD ONC ARIA FIRST TREATMENT DATE: NORMAL
RAD ONC ARIA PLAN FRACTIONS TREATED TO DATE: 5
RAD ONC ARIA PLAN ID: NORMAL
RAD ONC ARIA PLAN PRESCRIBED DOSE PER FRACTION: 2.5 GY
RAD ONC ARIA PLAN PRIMARY REFERENCE POINT: NORMAL
RAD ONC ARIA PLAN TOTAL FRACTIONS PRESCRIBED: 28
RAD ONC ARIA PLAN TOTAL PRESCRIBED DOSE: 7000 CGY
RAD ONC ARIA REFERENCE POINT DOSAGE GIVEN TO DATE: 12.5 GY
RAD ONC ARIA REFERENCE POINT ID: NORMAL
RAD ONC ARIA REFERENCE POINT SESSION DOSAGE GIVEN: 2.5 GY

## 2024-06-28 PROCEDURE — 77385: CPT | Performed by: RADIOLOGY

## 2024-06-28 PROCEDURE — 77336 RADIATION PHYSICS CONSULT: CPT | Performed by: RADIOLOGY

## 2024-06-30 ENCOUNTER — HOSPITAL ENCOUNTER (OUTPATIENT)
Dept: RADIATION ONCOLOGY | Facility: HOSPITAL | Age: 74
Discharge: HOME | End: 2024-06-30
Payer: MEDICARE

## 2024-06-30 LAB
RAD ONC ARIA COURSE ID: NORMAL
RAD ONC ARIA COURSE INTENT: NORMAL
RAD ONC ARIA COURSE LAST TREATMENT DATE: NORMAL
RAD ONC ARIA COURSE START DATE: NORMAL
RAD ONC ARIA COURSE TREATMENT ELAPSED DAYS: 6
RAD ONC ARIA FIRST TREATMENT DATE: NORMAL
RAD ONC ARIA PLAN FRACTIONS TREATED TO DATE: 6
RAD ONC ARIA PLAN ID: NORMAL
RAD ONC ARIA PLAN PRESCRIBED DOSE PER FRACTION: 2.5 GY
RAD ONC ARIA PLAN PRIMARY REFERENCE POINT: NORMAL
RAD ONC ARIA PLAN TOTAL FRACTIONS PRESCRIBED: 28
RAD ONC ARIA PLAN TOTAL PRESCRIBED DOSE: 7000 CGY
RAD ONC ARIA REFERENCE POINT DOSAGE GIVEN TO DATE: 15 GY
RAD ONC ARIA REFERENCE POINT ID: NORMAL
RAD ONC ARIA REFERENCE POINT SESSION DOSAGE GIVEN: 2.5 GY

## 2024-06-30 PROCEDURE — 77385: CPT | Performed by: RADIOLOGY

## 2024-06-30 PROCEDURE — 77427 RADIATION TX MANAGEMENT X5: CPT | Performed by: RADIOLOGY

## 2024-07-01 ENCOUNTER — HOSPITAL ENCOUNTER (OUTPATIENT)
Dept: RADIATION ONCOLOGY | Facility: HOSPITAL | Age: 74
Discharge: HOME | End: 2024-07-01
Payer: MEDICARE

## 2024-07-01 ENCOUNTER — HOSPITAL ENCOUNTER (OUTPATIENT)
Dept: RADIATION ONCOLOGY | Facility: HOSPITAL | Age: 74
Setting detail: RADIATION/ONCOLOGY SERIES
End: 2024-07-01
Payer: MEDICARE

## 2024-07-01 LAB
RAD ONC ARIA COURSE ID: NORMAL
RAD ONC ARIA COURSE INTENT: NORMAL
RAD ONC ARIA COURSE LAST TREATMENT DATE: NORMAL
RAD ONC ARIA COURSE START DATE: NORMAL
RAD ONC ARIA COURSE TREATMENT ELAPSED DAYS: 7
RAD ONC ARIA FIRST TREATMENT DATE: NORMAL
RAD ONC ARIA PLAN FRACTIONS TREATED TO DATE: 7
RAD ONC ARIA PLAN ID: NORMAL
RAD ONC ARIA PLAN PRESCRIBED DOSE PER FRACTION: 2.5 GY
RAD ONC ARIA PLAN PRIMARY REFERENCE POINT: NORMAL
RAD ONC ARIA PLAN TOTAL FRACTIONS PRESCRIBED: 28
RAD ONC ARIA PLAN TOTAL PRESCRIBED DOSE: 7000 CGY
RAD ONC ARIA REFERENCE POINT DOSAGE GIVEN TO DATE: 17.5 GY
RAD ONC ARIA REFERENCE POINT ID: NORMAL
RAD ONC ARIA REFERENCE POINT SESSION DOSAGE GIVEN: 2.5 GY

## 2024-07-01 PROCEDURE — 77014 CHG CT GUIDANCE RADIATION THERAPY FLDS PLACEMENT: CPT | Performed by: RADIOLOGY

## 2024-07-01 PROCEDURE — 77385: CPT | Performed by: RADIOLOGY

## 2024-07-02 ENCOUNTER — HOSPITAL ENCOUNTER (OUTPATIENT)
Dept: RADIATION ONCOLOGY | Facility: HOSPITAL | Age: 74
Discharge: HOME | End: 2024-07-02

## 2024-07-02 LAB
RAD ONC ARIA COURSE ID: NORMAL
RAD ONC ARIA COURSE INTENT: NORMAL
RAD ONC ARIA COURSE LAST TREATMENT DATE: NORMAL
RAD ONC ARIA COURSE START DATE: NORMAL
RAD ONC ARIA COURSE TREATMENT ELAPSED DAYS: 8
RAD ONC ARIA FIRST TREATMENT DATE: NORMAL
RAD ONC ARIA PLAN FRACTIONS TREATED TO DATE: 8
RAD ONC ARIA PLAN ID: NORMAL
RAD ONC ARIA PLAN PRESCRIBED DOSE PER FRACTION: 2.5 GY
RAD ONC ARIA PLAN PRIMARY REFERENCE POINT: NORMAL
RAD ONC ARIA PLAN TOTAL FRACTIONS PRESCRIBED: 28
RAD ONC ARIA PLAN TOTAL PRESCRIBED DOSE: 7000 CGY
RAD ONC ARIA REFERENCE POINT DOSAGE GIVEN TO DATE: 20 GY
RAD ONC ARIA REFERENCE POINT ID: NORMAL
RAD ONC ARIA REFERENCE POINT SESSION DOSAGE GIVEN: 2.5 GY

## 2024-07-02 PROCEDURE — 77014 CHG CT GUIDANCE RADIATION THERAPY FLDS PLACEMENT: CPT | Performed by: RADIOLOGY

## 2024-07-02 PROCEDURE — 77385: CPT | Performed by: RADIOLOGY

## 2024-07-03 ENCOUNTER — HOSPITAL ENCOUNTER (OUTPATIENT)
Dept: RADIATION ONCOLOGY | Facility: HOSPITAL | Age: 74
Discharge: HOME | End: 2024-07-03

## 2024-07-03 VITALS
HEART RATE: 67 BPM | DIASTOLIC BLOOD PRESSURE: 80 MMHG | RESPIRATION RATE: 16 BRPM | SYSTOLIC BLOOD PRESSURE: 156 MMHG | TEMPERATURE: 97.4 F | WEIGHT: 169.09 LBS | OXYGEN SATURATION: 100 % | BODY MASS INDEX: 22.93 KG/M2

## 2024-07-03 DIAGNOSIS — C61 PROSTATE CANCER: Primary | ICD-10-CM

## 2024-07-03 LAB
RAD ONC ARIA COURSE ID: NORMAL
RAD ONC ARIA COURSE INTENT: NORMAL
RAD ONC ARIA COURSE LAST TREATMENT DATE: NORMAL
RAD ONC ARIA COURSE START DATE: NORMAL
RAD ONC ARIA COURSE TREATMENT ELAPSED DAYS: 9
RAD ONC ARIA FIRST TREATMENT DATE: NORMAL
RAD ONC ARIA PLAN FRACTIONS TREATED TO DATE: 9
RAD ONC ARIA PLAN ID: NORMAL
RAD ONC ARIA PLAN PRESCRIBED DOSE PER FRACTION: 2.5 GY
RAD ONC ARIA PLAN PRIMARY REFERENCE POINT: NORMAL
RAD ONC ARIA PLAN TOTAL FRACTIONS PRESCRIBED: 28
RAD ONC ARIA PLAN TOTAL PRESCRIBED DOSE: 7000 CGY
RAD ONC ARIA REFERENCE POINT DOSAGE GIVEN TO DATE: 22.5 GY
RAD ONC ARIA REFERENCE POINT ID: NORMAL
RAD ONC ARIA REFERENCE POINT SESSION DOSAGE GIVEN: 2.5 GY

## 2024-07-03 PROCEDURE — 77014 CHG CT GUIDANCE RADIATION THERAPY FLDS PLACEMENT: CPT | Performed by: RADIOLOGY

## 2024-07-03 PROCEDURE — 77385: CPT | Performed by: RADIOLOGY

## 2024-07-03 NOTE — PROGRESS NOTES
On Treatment Visit       Patient: Fausto Zaman   YOB: 1950   Medical Record Number: [id number]     Date of Visit  July 3, 2024   Primary Diagnosis:Prostate cancer [C61]  Cancer Staging: Cancer Staging   No matching staging information was found for the patient.         was seen today for an on treatment visit.  He is receiving radiation therapy to the prostate + SV.  He  has received 2250 cGy in 9 fractions out of a planned dose of 7000 cGy in 28 fractions.     Today on exam the patient is tolerating radiation therapy well and has no new disease or treatment-related complaints.  He has mild nocturia and says his bowels are bit sluggish but not overly hard.  He eats prunes for this.                                            Review of Systems:   Review of Systems   Constitutional:  Negative for appetite change and fatigue.   HENT:  Negative for sore throat and trouble swallowing.    Eyes:  Negative for visual disturbance.   Respiratory:  Negative for cough and shortness of breath.    Cardiovascular:  Negative for chest pain, palpitations and leg swelling.   Gastrointestinal:  Positive for constipation. Negative for abdominal distention, diarrhea, nausea and rectal pain.   Genitourinary:  Negative for difficulty urinating, frequency and urgency.   Musculoskeletal:  Negative for arthralgias and back pain.   Skin:  Negative for color change.   Neurological:  Negative for dizziness and headaches.   Psychiatric/Behavioral:  Negative for sleep disturbance.        Vitals:     Vitals:    07/03/24 1141   BP: 156/80   Pulse: 67   Resp: 16   Temp: 97.4 °F (36.3 °C)   SpO2: 100%       Weight:   Wt Readings from Last 3 Encounters:   07/03/24 76.7 kg (169 lb 1.5 oz)   06/25/24 77.8 kg (171 lb 8.3 oz)   06/12/24 75.7 kg (166 lb 14.2 oz)      Pain:    Pain Score    07/03/24 1141   PainSc: 0-No pain         Physical Exam:  Physical Exam  Constitutional:       General: He is not in acute  distress.  Pulmonary:      Effort: Pulmonary effort is normal.   Neurological:      Mental Status: He is alert.   Psychiatric:         Mood and Affect: Mood normal.         Thought Content: Thought content normal.           Plan: I have reviewed treatment setup notes, checked and approved the daily guidance images.  I reviewed dose delivery, treatment parameters and deemed them appropriate. We plan to continue radiation therapy as prescribed.  Discussed bowel management.      Ely Cristina MD  Radiation Oncology   Electronically signed 7/3/2024  14:41 EDT

## 2024-07-09 ENCOUNTER — HOSPITAL ENCOUNTER (OUTPATIENT)
Dept: RADIATION ONCOLOGY | Facility: HOSPITAL | Age: 74
Discharge: HOME | End: 2024-07-09

## 2024-07-09 VITALS
SYSTOLIC BLOOD PRESSURE: 149 MMHG | BODY MASS INDEX: 23.08 KG/M2 | RESPIRATION RATE: 18 BRPM | WEIGHT: 170.19 LBS | TEMPERATURE: 98.4 F | OXYGEN SATURATION: 96 % | HEART RATE: 66 BPM | DIASTOLIC BLOOD PRESSURE: 83 MMHG

## 2024-07-09 DIAGNOSIS — C61 MALIGNANT NEOPLASM OF PROSTATE: Primary | ICD-10-CM

## 2024-07-09 LAB
RAD ONC ARIA COURSE ID: NORMAL
RAD ONC ARIA COURSE INTENT: NORMAL
RAD ONC ARIA COURSE LAST TREATMENT DATE: NORMAL
RAD ONC ARIA COURSE START DATE: NORMAL
RAD ONC ARIA COURSE TREATMENT ELAPSED DAYS: 15
RAD ONC ARIA FIRST TREATMENT DATE: NORMAL
RAD ONC ARIA PLAN FRACTIONS TREATED TO DATE: 10
RAD ONC ARIA PLAN ID: NORMAL
RAD ONC ARIA PLAN PRESCRIBED DOSE PER FRACTION: 2.5 GY
RAD ONC ARIA PLAN PRIMARY REFERENCE POINT: NORMAL
RAD ONC ARIA PLAN TOTAL FRACTIONS PRESCRIBED: 28
RAD ONC ARIA PLAN TOTAL PRESCRIBED DOSE: 7000 CGY
RAD ONC ARIA REFERENCE POINT DOSAGE GIVEN TO DATE: 25 GY
RAD ONC ARIA REFERENCE POINT ID: NORMAL
RAD ONC ARIA REFERENCE POINT SESSION DOSAGE GIVEN: 2.5 GY

## 2024-07-09 PROCEDURE — 77385: CPT | Performed by: RADIOLOGY

## 2024-07-09 PROCEDURE — 77336 RADIATION PHYSICS CONSULT: CPT | Performed by: RADIOLOGY

## 2024-07-09 PROCEDURE — 77014 CHG CT GUIDANCE RADIATION THERAPY FLDS PLACEMENT: CPT | Performed by: RADIOLOGY

## 2024-07-09 NOTE — PROGRESS NOTES
On Treatment Visit       Patient: Fausto Zaman   YOB: 1950   Medical Record Number: [id number]     Date of Visit  July 9, 2024   Primary Diagnosis:Malignant neoplasm of prostate [C61]           was seen today for an on treatment visit.  He is receiving radiation therapy to the pelvis/prostate. He  has received 2500 cGy in 10 fractions out of a planned dose of 7000 cGy in 28 fractions.     Today on exam the patient is tolerating radiation therapy well and has no new disease or treatment-related complaints.                                           Review of Systems:   Review of Systems   Constitutional:  Positive for fatigue (1/10). Negative for appetite change.   Respiratory:  Negative for cough and shortness of breath.    Gastrointestinal:  Negative for abdominal pain, constipation, diarrhea and nausea.   Genitourinary:  Positive for frequency and urgency. Negative for difficulty urinating and dysuria.        Nocturia x 0   Musculoskeletal:  Negative for arthralgias and back pain.   Skin:  Negative for rash.   Neurological:  Negative for dizziness, weakness, light-headedness and headaches.   Psychiatric/Behavioral:  Negative for sleep disturbance.        Vitals:     Vitals:    07/09/24 1142   BP: 149/83   Pulse: 66   Resp: 18   Temp: 98.4 °F (36.9 °C)   SpO2: 96%       Weight:   Wt Readings from Last 3 Encounters:   07/09/24 77.2 kg (170 lb 3.1 oz)   07/03/24 76.7 kg (169 lb 1.5 oz)   06/25/24 77.8 kg (171 lb 8.3 oz)      Pain:    Pain Score    07/09/24 1142   PainSc: 0-No pain         Physical Exam:  Gen: WD/WN; NAD  HEENT: MMM  Trachea: midline  Chest: symmetric  Resp: normal respiratory effort  Extr: warm, well-perfused  Neuro: awake and alert; no aphasia or neglect    Plan: I have reviewed treatment setup notes, checked and approved the daily guidance images.  I reviewed dose delivery, treatment parameters and deemed them appropriate. We plan to continue radiation therapy as  prescribed.          Radiation Oncology    Electronically signed by Aldo Mejia MD 7/9/2024  14:40 EDT

## 2024-07-11 ENCOUNTER — HOSPITAL ENCOUNTER (OUTPATIENT)
Dept: RADIATION ONCOLOGY | Facility: HOSPITAL | Age: 74
Discharge: HOME | End: 2024-07-11

## 2024-07-11 LAB
RAD ONC ARIA COURSE ID: NORMAL
RAD ONC ARIA COURSE INTENT: NORMAL
RAD ONC ARIA COURSE LAST TREATMENT DATE: NORMAL
RAD ONC ARIA COURSE START DATE: NORMAL
RAD ONC ARIA COURSE TREATMENT ELAPSED DAYS: 17
RAD ONC ARIA FIRST TREATMENT DATE: NORMAL
RAD ONC ARIA PLAN FRACTIONS TREATED TO DATE: 11
RAD ONC ARIA PLAN ID: NORMAL
RAD ONC ARIA PLAN PRESCRIBED DOSE PER FRACTION: 2.5 GY
RAD ONC ARIA PLAN PRIMARY REFERENCE POINT: NORMAL
RAD ONC ARIA PLAN TOTAL FRACTIONS PRESCRIBED: 28
RAD ONC ARIA PLAN TOTAL PRESCRIBED DOSE: 7000 CGY
RAD ONC ARIA REFERENCE POINT DOSAGE GIVEN TO DATE: 27.5 GY
RAD ONC ARIA REFERENCE POINT ID: NORMAL
RAD ONC ARIA REFERENCE POINT SESSION DOSAGE GIVEN: 2.5 GY

## 2024-07-11 PROCEDURE — 77014 CHG CT GUIDANCE RADIATION THERAPY FLDS PLACEMENT: CPT | Performed by: RADIOLOGY

## 2024-07-11 PROCEDURE — 77385: CPT | Performed by: RADIOLOGY

## 2024-07-11 PROCEDURE — 77427 RADIATION TX MANAGEMENT X5: CPT | Performed by: RADIOLOGY

## 2024-07-12 ENCOUNTER — HOSPITAL ENCOUNTER (OUTPATIENT)
Dept: RADIATION ONCOLOGY | Facility: HOSPITAL | Age: 74
Discharge: HOME | End: 2024-07-12

## 2024-07-12 LAB
RAD ONC ARIA COURSE ID: NORMAL
RAD ONC ARIA COURSE INTENT: NORMAL
RAD ONC ARIA COURSE LAST TREATMENT DATE: NORMAL
RAD ONC ARIA COURSE START DATE: NORMAL
RAD ONC ARIA COURSE TREATMENT ELAPSED DAYS: 18
RAD ONC ARIA FIRST TREATMENT DATE: NORMAL
RAD ONC ARIA PLAN FRACTIONS TREATED TO DATE: 12
RAD ONC ARIA PLAN ID: NORMAL
RAD ONC ARIA PLAN PRESCRIBED DOSE PER FRACTION: 2.5 GY
RAD ONC ARIA PLAN PRIMARY REFERENCE POINT: NORMAL
RAD ONC ARIA PLAN TOTAL FRACTIONS PRESCRIBED: 28
RAD ONC ARIA PLAN TOTAL PRESCRIBED DOSE: 7000 CGY
RAD ONC ARIA REFERENCE POINT DOSAGE GIVEN TO DATE: 30 GY
RAD ONC ARIA REFERENCE POINT ID: NORMAL
RAD ONC ARIA REFERENCE POINT SESSION DOSAGE GIVEN: 2.5 GY

## 2024-07-12 PROCEDURE — 77014 CHG CT GUIDANCE RADIATION THERAPY FLDS PLACEMENT: CPT | Performed by: RADIOLOGY

## 2024-07-12 PROCEDURE — 77385: CPT | Performed by: RADIOLOGY

## 2024-07-15 ENCOUNTER — HOSPITAL ENCOUNTER (OUTPATIENT)
Dept: RADIATION ONCOLOGY | Facility: HOSPITAL | Age: 74
Discharge: HOME | End: 2024-07-15
Payer: MEDICARE

## 2024-07-15 LAB
RAD ONC ARIA COURSE ID: NORMAL
RAD ONC ARIA COURSE INTENT: NORMAL
RAD ONC ARIA COURSE LAST TREATMENT DATE: NORMAL
RAD ONC ARIA COURSE START DATE: NORMAL
RAD ONC ARIA COURSE TREATMENT ELAPSED DAYS: 21
RAD ONC ARIA FIRST TREATMENT DATE: NORMAL
RAD ONC ARIA PLAN FRACTIONS TREATED TO DATE: 13
RAD ONC ARIA PLAN ID: NORMAL
RAD ONC ARIA PLAN PRESCRIBED DOSE PER FRACTION: 2.5 GY
RAD ONC ARIA PLAN PRIMARY REFERENCE POINT: NORMAL
RAD ONC ARIA PLAN TOTAL FRACTIONS PRESCRIBED: 28
RAD ONC ARIA PLAN TOTAL PRESCRIBED DOSE: 7000 CGY
RAD ONC ARIA REFERENCE POINT DOSAGE GIVEN TO DATE: 32.5 GY
RAD ONC ARIA REFERENCE POINT ID: NORMAL
RAD ONC ARIA REFERENCE POINT SESSION DOSAGE GIVEN: 2.5 GY

## 2024-07-15 PROCEDURE — 77385: CPT | Performed by: RADIOLOGY

## 2024-07-15 PROCEDURE — 77014 CHG CT GUIDANCE RADIATION THERAPY FLDS PLACEMENT: CPT | Performed by: RADIOLOGY

## 2024-07-16 ENCOUNTER — HOSPITAL ENCOUNTER (OUTPATIENT)
Dept: RADIATION ONCOLOGY | Facility: HOSPITAL | Age: 74
Discharge: HOME | End: 2024-07-16

## 2024-07-16 VITALS
WEIGHT: 167.55 LBS | DIASTOLIC BLOOD PRESSURE: 72 MMHG | BODY MASS INDEX: 22.72 KG/M2 | RESPIRATION RATE: 20 BRPM | OXYGEN SATURATION: 100 % | SYSTOLIC BLOOD PRESSURE: 125 MMHG | TEMPERATURE: 98.1 F | HEART RATE: 76 BPM

## 2024-07-16 DIAGNOSIS — C61 MALIGNANT NEOPLASM OF PROSTATE: Primary | ICD-10-CM

## 2024-07-16 LAB
RAD ONC ARIA COURSE ID: NORMAL
RAD ONC ARIA COURSE INTENT: NORMAL
RAD ONC ARIA COURSE LAST TREATMENT DATE: NORMAL
RAD ONC ARIA COURSE START DATE: NORMAL
RAD ONC ARIA COURSE TREATMENT ELAPSED DAYS: 22
RAD ONC ARIA FIRST TREATMENT DATE: NORMAL
RAD ONC ARIA PLAN FRACTIONS TREATED TO DATE: 14
RAD ONC ARIA PLAN ID: NORMAL
RAD ONC ARIA PLAN PRESCRIBED DOSE PER FRACTION: 2.5 GY
RAD ONC ARIA PLAN PRIMARY REFERENCE POINT: NORMAL
RAD ONC ARIA PLAN TOTAL FRACTIONS PRESCRIBED: 28
RAD ONC ARIA PLAN TOTAL PRESCRIBED DOSE: 7000 CGY
RAD ONC ARIA REFERENCE POINT DOSAGE GIVEN TO DATE: 35 GY
RAD ONC ARIA REFERENCE POINT ID: NORMAL
RAD ONC ARIA REFERENCE POINT SESSION DOSAGE GIVEN: 2.5 GY

## 2024-07-16 PROCEDURE — 77385: CPT | Performed by: RADIOLOGY

## 2024-07-16 PROCEDURE — 77014 CHG CT GUIDANCE RADIATION THERAPY FLDS PLACEMENT: CPT | Performed by: RADIOLOGY

## 2024-07-16 NOTE — PROGRESS NOTES
On Treatment Visit       Patient: Fausto Zaman   YOB: 1950   Medical Record Number: [id number]     Date of Visit  July 16, 2024   Primary Diagnosis:Malignant neoplasm of prostate [C61]           was seen today for an on treatment visit.  He is receiving radiation therapy to the pelvis/prostate. He  has received 3500 cGy in 14 fractions out of a planned dose of 7000 cGy in 28 fractions.     Today on exam the patient is tolerating radiation therapy well and has no new disease or treatment-related complaints.                                           Review of Systems:   Review of Systems   Constitutional:  Negative for appetite change, chills, fatigue, fever and unexpected weight change.   Respiratory:  Negative for cough, chest tightness, shortness of breath and wheezing.    Cardiovascular:  Negative for chest pain, palpitations and leg swelling.   Gastrointestinal:  Positive for constipation (Drinks prune juice as needed, last bm yesterday.). Negative for abdominal distention, abdominal pain, anal bleeding, blood in stool, diarrhea, nausea, rectal pain and vomiting.   Genitourinary:  Positive for frequency (Occasional, ongoing) and urgency (Occasional, ongoing). Negative for decreased urine volume, difficulty urinating, dysuria and hematuria.        Nocturia x 0  Occasionally weaker stream, ongoing  Occasional leakage noted with urgency, ongoing   Musculoskeletal:  Negative for arthralgias, back pain, gait problem and joint swelling.   Skin:  Negative for color change and rash.   Neurological:  Negative for dizziness, weakness and headaches.   Psychiatric/Behavioral:  Negative for sleep disturbance.        Vitals:     Vitals:    07/16/24 1144   BP: 125/72   Pulse: 76   Resp: 20   Temp: 98.1 °F (36.7 °C)   SpO2: 100%       Weight:   Wt Readings from Last 3 Encounters:   07/16/24 76 kg (167 lb 8.8 oz)   07/09/24 77.2 kg (170 lb 3.1 oz)   07/03/24 76.7 kg (169 lb 1.5 oz)      Pain:    Pain  Score    07/16/24 1144   PainSc: 0-No pain         Physical Exam:  Gen: WD/WN; NAD  HEENT: MMM  Trachea: midline  Chest: symmetric  Resp: normal respiratory effort  Extr: warm, well-perfused  Neuro: awake and alert; no aphasia or neglect    Plan: I have reviewed treatment setup notes, checked and approved the daily guidance images.  I reviewed dose delivery, treatment parameters and deemed them appropriate. We plan to continue radiation therapy as prescribed.          Radiation Oncology    Electronically signed by Aldo Mejia MD 7/16/2024  12:04 EDT

## 2024-07-17 ENCOUNTER — HOSPITAL ENCOUNTER (OUTPATIENT)
Dept: RADIATION ONCOLOGY | Facility: HOSPITAL | Age: 74
Discharge: HOME | End: 2024-07-17

## 2024-07-17 LAB
RAD ONC ARIA COURSE ID: NORMAL
RAD ONC ARIA COURSE INTENT: NORMAL
RAD ONC ARIA COURSE LAST TREATMENT DATE: NORMAL
RAD ONC ARIA COURSE START DATE: NORMAL
RAD ONC ARIA COURSE TREATMENT ELAPSED DAYS: 23
RAD ONC ARIA FIRST TREATMENT DATE: NORMAL
RAD ONC ARIA PLAN FRACTIONS TREATED TO DATE: 15
RAD ONC ARIA PLAN ID: NORMAL
RAD ONC ARIA PLAN PRESCRIBED DOSE PER FRACTION: 2.5 GY
RAD ONC ARIA PLAN PRIMARY REFERENCE POINT: NORMAL
RAD ONC ARIA PLAN TOTAL FRACTIONS PRESCRIBED: 28
RAD ONC ARIA PLAN TOTAL PRESCRIBED DOSE: 7000 CGY
RAD ONC ARIA REFERENCE POINT DOSAGE GIVEN TO DATE: 37.5 GY
RAD ONC ARIA REFERENCE POINT ID: NORMAL
RAD ONC ARIA REFERENCE POINT SESSION DOSAGE GIVEN: 2.5 GY

## 2024-07-17 PROCEDURE — 77385: CPT | Performed by: RADIOLOGY

## 2024-07-17 PROCEDURE — 77336 RADIATION PHYSICS CONSULT: CPT | Performed by: RADIOLOGY

## 2024-07-17 PROCEDURE — 77014 CHG CT GUIDANCE RADIATION THERAPY FLDS PLACEMENT: CPT | Performed by: RADIOLOGY

## 2024-07-18 ENCOUNTER — HOSPITAL ENCOUNTER (OUTPATIENT)
Dept: RADIATION ONCOLOGY | Facility: HOSPITAL | Age: 74
Discharge: HOME | End: 2024-07-18

## 2024-07-18 LAB
RAD ONC ARIA COURSE ID: NORMAL
RAD ONC ARIA COURSE INTENT: NORMAL
RAD ONC ARIA COURSE LAST TREATMENT DATE: NORMAL
RAD ONC ARIA COURSE START DATE: NORMAL
RAD ONC ARIA COURSE TREATMENT ELAPSED DAYS: 24
RAD ONC ARIA FIRST TREATMENT DATE: NORMAL
RAD ONC ARIA PLAN FRACTIONS TREATED TO DATE: 16
RAD ONC ARIA PLAN ID: NORMAL
RAD ONC ARIA PLAN PRESCRIBED DOSE PER FRACTION: 2.5 GY
RAD ONC ARIA PLAN PRIMARY REFERENCE POINT: NORMAL
RAD ONC ARIA PLAN TOTAL FRACTIONS PRESCRIBED: 28
RAD ONC ARIA PLAN TOTAL PRESCRIBED DOSE: 7000 CGY
RAD ONC ARIA REFERENCE POINT DOSAGE GIVEN TO DATE: 40 GY
RAD ONC ARIA REFERENCE POINT ID: NORMAL
RAD ONC ARIA REFERENCE POINT SESSION DOSAGE GIVEN: 2.5 GY

## 2024-07-18 PROCEDURE — 77427 RADIATION TX MANAGEMENT X5: CPT | Performed by: RADIOLOGY

## 2024-07-18 PROCEDURE — 77014 CHG CT GUIDANCE RADIATION THERAPY FLDS PLACEMENT: CPT | Performed by: RADIOLOGY

## 2024-07-18 PROCEDURE — 77385: CPT | Performed by: RADIOLOGY

## 2024-07-22 ENCOUNTER — HOSPITAL ENCOUNTER (OUTPATIENT)
Dept: RADIATION ONCOLOGY | Facility: HOSPITAL | Age: 74
Discharge: HOME | End: 2024-07-22
Payer: MEDICARE

## 2024-07-22 LAB
RAD ONC ARIA COURSE ID: NORMAL
RAD ONC ARIA COURSE INTENT: NORMAL
RAD ONC ARIA COURSE LAST TREATMENT DATE: NORMAL
RAD ONC ARIA COURSE START DATE: NORMAL
RAD ONC ARIA COURSE TREATMENT ELAPSED DAYS: 28
RAD ONC ARIA FIRST TREATMENT DATE: NORMAL
RAD ONC ARIA PLAN FRACTIONS TREATED TO DATE: 17
RAD ONC ARIA PLAN ID: NORMAL
RAD ONC ARIA PLAN PRESCRIBED DOSE PER FRACTION: 2.5 GY
RAD ONC ARIA PLAN PRIMARY REFERENCE POINT: NORMAL
RAD ONC ARIA PLAN TOTAL FRACTIONS PRESCRIBED: 28
RAD ONC ARIA PLAN TOTAL PRESCRIBED DOSE: 7000 CGY
RAD ONC ARIA REFERENCE POINT DOSAGE GIVEN TO DATE: 42.5 GY
RAD ONC ARIA REFERENCE POINT ID: NORMAL
RAD ONC ARIA REFERENCE POINT SESSION DOSAGE GIVEN: 2.5 GY

## 2024-07-22 PROCEDURE — 77014 CHG CT GUIDANCE RADIATION THERAPY FLDS PLACEMENT: CPT | Performed by: RADIOLOGY

## 2024-07-22 PROCEDURE — 77385: CPT | Performed by: RADIOLOGY

## 2024-07-23 ENCOUNTER — HOSPITAL ENCOUNTER (OUTPATIENT)
Dept: RADIATION ONCOLOGY | Facility: HOSPITAL | Age: 74
Discharge: HOME | End: 2024-07-23

## 2024-07-23 VITALS
DIASTOLIC BLOOD PRESSURE: 82 MMHG | BODY MASS INDEX: 23.2 KG/M2 | SYSTOLIC BLOOD PRESSURE: 138 MMHG | OXYGEN SATURATION: 96 % | TEMPERATURE: 97.8 F | HEART RATE: 66 BPM | WEIGHT: 171.08 LBS | RESPIRATION RATE: 16 BRPM

## 2024-07-23 DIAGNOSIS — C61 MALIGNANT NEOPLASM OF PROSTATE: Primary | ICD-10-CM

## 2024-07-23 LAB
RAD ONC ARIA COURSE ID: NORMAL
RAD ONC ARIA COURSE INTENT: NORMAL
RAD ONC ARIA COURSE LAST TREATMENT DATE: NORMAL
RAD ONC ARIA COURSE START DATE: NORMAL
RAD ONC ARIA COURSE TREATMENT ELAPSED DAYS: 29
RAD ONC ARIA FIRST TREATMENT DATE: NORMAL
RAD ONC ARIA PLAN FRACTIONS TREATED TO DATE: 18
RAD ONC ARIA PLAN ID: NORMAL
RAD ONC ARIA PLAN PRESCRIBED DOSE PER FRACTION: 2.5 GY
RAD ONC ARIA PLAN PRIMARY REFERENCE POINT: NORMAL
RAD ONC ARIA PLAN TOTAL FRACTIONS PRESCRIBED: 28
RAD ONC ARIA PLAN TOTAL PRESCRIBED DOSE: 7000 CGY
RAD ONC ARIA REFERENCE POINT DOSAGE GIVEN TO DATE: 45 GY
RAD ONC ARIA REFERENCE POINT ID: NORMAL
RAD ONC ARIA REFERENCE POINT SESSION DOSAGE GIVEN: 2.5 GY

## 2024-07-23 PROCEDURE — 77014 CHG CT GUIDANCE RADIATION THERAPY FLDS PLACEMENT: CPT | Performed by: RADIOLOGY

## 2024-07-23 PROCEDURE — 77385: CPT | Performed by: RADIOLOGY

## 2024-07-23 NOTE — PROGRESS NOTES
On Treatment Visit       Patient: Fausto Zaman   YOB: 1950   Medical Record Number: [id number]     Date of Visit  July 23, 2024   Primary Diagnosis:Malignant neoplasm of prostate [C61]           was seen today for an on treatment visit.  He is receiving radiation therapy to the pelvis/prostate. He  has received 4500 cGy in 18 fractions out of a planned dose of 7000 cGy in 28 fractions.     Today on exam the patient is tolerating radiation therapy well and has no new disease or treatment-related complaints.                                           Review of Systems:   Review of Systems   Constitutional:  Negative for appetite change, chills, fatigue, fever and unexpected weight change.   Respiratory:  Negative for cough, chest tightness, shortness of breath and wheezing.    Cardiovascular:  Negative for chest pain, palpitations and leg swelling.   Gastrointestinal:  Positive for constipation (Drinks prune juice as needed, last bm yesterday.). Negative for abdominal distention, abdominal pain, anal bleeding, blood in stool, diarrhea, nausea, rectal pain and vomiting.   Genitourinary:  Positive for frequency (Occasional, ongoing) and urgency (Occasional, ongoing). Negative for decreased urine volume, difficulty urinating, dysuria and hematuria.        Nocturia x 0  Occasionally weaker stream, ongoing  Occasional leakage noted with urgency, ongoing   Musculoskeletal:  Negative for arthralgias, back pain, gait problem and joint swelling.   Skin:  Negative for color change and rash.   Neurological:  Negative for dizziness, weakness and headaches.   Psychiatric/Behavioral:  Positive for sleep disturbance (Nocturia 1-2).        Vitals:     Vitals:    07/23/24 1146   BP: 138/82   Pulse: 66   Resp: 16   Temp: 97.8 °F (36.6 °C)   SpO2: 96%       Weight:   Wt Readings from Last 3 Encounters:   07/23/24 77.6 kg (171 lb 1.2 oz)   07/16/24 76 kg (167 lb 8.8 oz)   07/09/24 77.2 kg (170 lb 3.1 oz)       Pain:    Pain Score    07/23/24 1146   PainSc: 0-No pain         Physical Exam:  Gen: WD/WN; NAD  HEENT: MMM  Trachea: midline  Chest: symmetric  Resp: normal respiratory effort  Extr: warm, well-perfused  Neuro: awake and alert; no aphasia or neglect    Plan: I have reviewed treatment setup notes, checked and approved the daily guidance images.  I reviewed dose delivery, treatment parameters and deemed them appropriate. We plan to continue radiation therapy as prescribed.          Radiation Oncology    Electronically signed by Aldo Mejia MD 7/23/2024  14:04 EDT     Vanessa Valdez

## 2024-07-24 ENCOUNTER — HOSPITAL ENCOUNTER (OUTPATIENT)
Dept: RADIATION ONCOLOGY | Facility: HOSPITAL | Age: 74
Discharge: HOME | End: 2024-07-24

## 2024-07-24 LAB
RAD ONC ARIA COURSE ID: NORMAL
RAD ONC ARIA COURSE INTENT: NORMAL
RAD ONC ARIA COURSE LAST TREATMENT DATE: NORMAL
RAD ONC ARIA COURSE START DATE: NORMAL
RAD ONC ARIA COURSE TREATMENT ELAPSED DAYS: 30
RAD ONC ARIA FIRST TREATMENT DATE: NORMAL
RAD ONC ARIA PLAN FRACTIONS TREATED TO DATE: 19
RAD ONC ARIA PLAN ID: NORMAL
RAD ONC ARIA PLAN PRESCRIBED DOSE PER FRACTION: 2.5 GY
RAD ONC ARIA PLAN PRIMARY REFERENCE POINT: NORMAL
RAD ONC ARIA PLAN TOTAL FRACTIONS PRESCRIBED: 28
RAD ONC ARIA PLAN TOTAL PRESCRIBED DOSE: 7000 CGY
RAD ONC ARIA REFERENCE POINT DOSAGE GIVEN TO DATE: 47.5 GY
RAD ONC ARIA REFERENCE POINT ID: NORMAL
RAD ONC ARIA REFERENCE POINT SESSION DOSAGE GIVEN: 2.5 GY

## 2024-07-24 PROCEDURE — 77014 CHG CT GUIDANCE RADIATION THERAPY FLDS PLACEMENT: CPT | Performed by: RADIOLOGY

## 2024-07-24 PROCEDURE — 77385: CPT | Performed by: RADIOLOGY

## 2024-07-25 ENCOUNTER — HOSPITAL ENCOUNTER (OUTPATIENT)
Dept: RADIATION ONCOLOGY | Facility: HOSPITAL | Age: 74
Discharge: HOME | End: 2024-07-25

## 2024-07-25 LAB
RAD ONC ARIA COURSE ID: NORMAL
RAD ONC ARIA COURSE INTENT: NORMAL
RAD ONC ARIA COURSE LAST TREATMENT DATE: NORMAL
RAD ONC ARIA COURSE START DATE: NORMAL
RAD ONC ARIA COURSE TREATMENT ELAPSED DAYS: 31
RAD ONC ARIA FIRST TREATMENT DATE: NORMAL
RAD ONC ARIA PLAN FRACTIONS TREATED TO DATE: 20
RAD ONC ARIA PLAN ID: NORMAL
RAD ONC ARIA PLAN PRESCRIBED DOSE PER FRACTION: 2.5 GY
RAD ONC ARIA PLAN PRIMARY REFERENCE POINT: NORMAL
RAD ONC ARIA PLAN TOTAL FRACTIONS PRESCRIBED: 28
RAD ONC ARIA PLAN TOTAL PRESCRIBED DOSE: 7000 CGY
RAD ONC ARIA REFERENCE POINT DOSAGE GIVEN TO DATE: 50 GY
RAD ONC ARIA REFERENCE POINT ID: NORMAL
RAD ONC ARIA REFERENCE POINT SESSION DOSAGE GIVEN: 2.5 GY

## 2024-07-25 PROCEDURE — 77336 RADIATION PHYSICS CONSULT: CPT | Performed by: RADIOLOGY

## 2024-07-25 PROCEDURE — 77014 CHG CT GUIDANCE RADIATION THERAPY FLDS PLACEMENT: CPT | Performed by: RADIOLOGY

## 2024-07-25 PROCEDURE — 77385: CPT | Performed by: RADIOLOGY

## 2024-07-26 ENCOUNTER — HOSPITAL ENCOUNTER (OUTPATIENT)
Dept: RADIATION ONCOLOGY | Facility: HOSPITAL | Age: 74
Discharge: HOME | End: 2024-07-26

## 2024-07-26 LAB
RAD ONC ARIA COURSE ID: NORMAL
RAD ONC ARIA COURSE INTENT: NORMAL
RAD ONC ARIA COURSE LAST TREATMENT DATE: NORMAL
RAD ONC ARIA COURSE START DATE: NORMAL
RAD ONC ARIA COURSE TREATMENT ELAPSED DAYS: 32
RAD ONC ARIA FIRST TREATMENT DATE: NORMAL
RAD ONC ARIA PLAN FRACTIONS TREATED TO DATE: 21
RAD ONC ARIA PLAN ID: NORMAL
RAD ONC ARIA PLAN PRESCRIBED DOSE PER FRACTION: 2.5 GY
RAD ONC ARIA PLAN PRIMARY REFERENCE POINT: NORMAL
RAD ONC ARIA PLAN TOTAL FRACTIONS PRESCRIBED: 28
RAD ONC ARIA PLAN TOTAL PRESCRIBED DOSE: 7000 CGY
RAD ONC ARIA REFERENCE POINT DOSAGE GIVEN TO DATE: 52.5 GY
RAD ONC ARIA REFERENCE POINT ID: NORMAL
RAD ONC ARIA REFERENCE POINT SESSION DOSAGE GIVEN: 2.5 GY

## 2024-07-26 PROCEDURE — 77385: CPT | Performed by: RADIOLOGY

## 2024-07-26 PROCEDURE — 77427 RADIATION TX MANAGEMENT X5: CPT | Performed by: RADIOLOGY

## 2024-07-26 PROCEDURE — 77014 CHG CT GUIDANCE RADIATION THERAPY FLDS PLACEMENT: CPT | Performed by: RADIOLOGY

## 2024-07-29 ENCOUNTER — HOSPITAL ENCOUNTER (OUTPATIENT)
Dept: RADIATION ONCOLOGY | Facility: HOSPITAL | Age: 74
Discharge: HOME | End: 2024-07-29
Payer: MEDICARE

## 2024-07-29 LAB
RAD ONC ARIA COURSE ID: NORMAL
RAD ONC ARIA COURSE INTENT: NORMAL
RAD ONC ARIA COURSE LAST TREATMENT DATE: NORMAL
RAD ONC ARIA COURSE START DATE: NORMAL
RAD ONC ARIA COURSE TREATMENT ELAPSED DAYS: 35
RAD ONC ARIA FIRST TREATMENT DATE: NORMAL
RAD ONC ARIA PLAN FRACTIONS TREATED TO DATE: 22
RAD ONC ARIA PLAN ID: NORMAL
RAD ONC ARIA PLAN PRESCRIBED DOSE PER FRACTION: 2.5 GY
RAD ONC ARIA PLAN PRIMARY REFERENCE POINT: NORMAL
RAD ONC ARIA PLAN TOTAL FRACTIONS PRESCRIBED: 28
RAD ONC ARIA PLAN TOTAL PRESCRIBED DOSE: 7000 CGY
RAD ONC ARIA REFERENCE POINT DOSAGE GIVEN TO DATE: 55 GY
RAD ONC ARIA REFERENCE POINT ID: NORMAL
RAD ONC ARIA REFERENCE POINT SESSION DOSAGE GIVEN: 2.5 GY

## 2024-07-29 PROCEDURE — 77385: CPT | Performed by: RADIOLOGY

## 2024-07-29 PROCEDURE — 77014 CHG CT GUIDANCE RADIATION THERAPY FLDS PLACEMENT: CPT | Performed by: RADIOLOGY

## 2024-07-30 ENCOUNTER — HOSPITAL ENCOUNTER (OUTPATIENT)
Dept: RADIATION ONCOLOGY | Facility: HOSPITAL | Age: 74
Discharge: HOME | End: 2024-07-30

## 2024-07-30 LAB
RAD ONC ARIA COURSE ID: NORMAL
RAD ONC ARIA COURSE INTENT: NORMAL
RAD ONC ARIA COURSE LAST TREATMENT DATE: NORMAL
RAD ONC ARIA COURSE START DATE: NORMAL
RAD ONC ARIA COURSE TREATMENT ELAPSED DAYS: 36
RAD ONC ARIA FIRST TREATMENT DATE: NORMAL
RAD ONC ARIA PLAN FRACTIONS TREATED TO DATE: 23
RAD ONC ARIA PLAN ID: NORMAL
RAD ONC ARIA PLAN PRESCRIBED DOSE PER FRACTION: 2.5 GY
RAD ONC ARIA PLAN PRIMARY REFERENCE POINT: NORMAL
RAD ONC ARIA PLAN TOTAL FRACTIONS PRESCRIBED: 28
RAD ONC ARIA PLAN TOTAL PRESCRIBED DOSE: 7000 CGY
RAD ONC ARIA REFERENCE POINT DOSAGE GIVEN TO DATE: 57.5 GY
RAD ONC ARIA REFERENCE POINT ID: NORMAL
RAD ONC ARIA REFERENCE POINT SESSION DOSAGE GIVEN: 2.5 GY

## 2024-07-30 PROCEDURE — 77385: CPT | Performed by: RADIOLOGY

## 2024-07-31 ENCOUNTER — HOSPITAL ENCOUNTER (OUTPATIENT)
Dept: RADIATION ONCOLOGY | Facility: HOSPITAL | Age: 74
Discharge: HOME | End: 2024-07-31

## 2024-07-31 LAB
RAD ONC ARIA COURSE ID: NORMAL
RAD ONC ARIA COURSE INTENT: NORMAL
RAD ONC ARIA COURSE LAST TREATMENT DATE: NORMAL
RAD ONC ARIA COURSE START DATE: NORMAL
RAD ONC ARIA COURSE TREATMENT ELAPSED DAYS: 37
RAD ONC ARIA FIRST TREATMENT DATE: NORMAL
RAD ONC ARIA PLAN FRACTIONS TREATED TO DATE: 24
RAD ONC ARIA PLAN ID: NORMAL
RAD ONC ARIA PLAN PRESCRIBED DOSE PER FRACTION: 2.5 GY
RAD ONC ARIA PLAN PRIMARY REFERENCE POINT: NORMAL
RAD ONC ARIA PLAN TOTAL FRACTIONS PRESCRIBED: 28
RAD ONC ARIA PLAN TOTAL PRESCRIBED DOSE: 7000 CGY
RAD ONC ARIA REFERENCE POINT DOSAGE GIVEN TO DATE: 60 GY
RAD ONC ARIA REFERENCE POINT ID: NORMAL
RAD ONC ARIA REFERENCE POINT SESSION DOSAGE GIVEN: 2.5 GY

## 2024-07-31 PROCEDURE — 77385: CPT | Performed by: RADIOLOGY

## 2024-08-01 ENCOUNTER — HOSPITAL ENCOUNTER (OUTPATIENT)
Dept: RADIATION ONCOLOGY | Facility: HOSPITAL | Age: 74
Setting detail: RADIATION/ONCOLOGY SERIES
End: 2024-08-01
Payer: MEDICARE

## 2024-08-01 ENCOUNTER — HOSPITAL ENCOUNTER (OUTPATIENT)
Dept: RADIATION ONCOLOGY | Facility: HOSPITAL | Age: 74
Discharge: HOME | End: 2024-08-01

## 2024-08-01 VITALS
RESPIRATION RATE: 18 BRPM | HEART RATE: 68 BPM | DIASTOLIC BLOOD PRESSURE: 81 MMHG | OXYGEN SATURATION: 98 % | TEMPERATURE: 97.7 F | WEIGHT: 171.3 LBS | BODY MASS INDEX: 23.23 KG/M2 | SYSTOLIC BLOOD PRESSURE: 156 MMHG

## 2024-08-01 DIAGNOSIS — C61 MALIGNANT NEOPLASM OF PROSTATE: Primary | ICD-10-CM

## 2024-08-01 LAB
RAD ONC ARIA COURSE ID: NORMAL
RAD ONC ARIA COURSE INTENT: NORMAL
RAD ONC ARIA COURSE LAST TREATMENT DATE: NORMAL
RAD ONC ARIA COURSE START DATE: NORMAL
RAD ONC ARIA COURSE TREATMENT ELAPSED DAYS: 38
RAD ONC ARIA FIRST TREATMENT DATE: NORMAL
RAD ONC ARIA PLAN FRACTIONS TREATED TO DATE: 25
RAD ONC ARIA PLAN ID: NORMAL
RAD ONC ARIA PLAN PRESCRIBED DOSE PER FRACTION: 2.5 GY
RAD ONC ARIA PLAN PRIMARY REFERENCE POINT: NORMAL
RAD ONC ARIA PLAN TOTAL FRACTIONS PRESCRIBED: 28
RAD ONC ARIA PLAN TOTAL PRESCRIBED DOSE: 7000 CGY
RAD ONC ARIA REFERENCE POINT DOSAGE GIVEN TO DATE: 62.5 GY
RAD ONC ARIA REFERENCE POINT ID: NORMAL
RAD ONC ARIA REFERENCE POINT SESSION DOSAGE GIVEN: 2.5 GY

## 2024-08-01 PROCEDURE — 77014 CHG CT GUIDANCE RADIATION THERAPY FLDS PLACEMENT: CPT | Performed by: RADIOLOGY

## 2024-08-01 PROCEDURE — 77336 RADIATION PHYSICS CONSULT: CPT | Performed by: RADIOLOGY

## 2024-08-01 PROCEDURE — 77385: CPT | Performed by: RADIOLOGY

## 2024-08-01 NOTE — PROGRESS NOTES
On Treatment Visit       Patient: Fausto Zaman   YOB: 1950   Medical Record Number: [id number]     Date of Visit  August 1, 2024   Primary Diagnosis:Malignant neoplasm of prostate [C61]       was seen today for an on treatment visit.  He is receiving radiation therapy to the pelvis/prostate. He  has received 6250 cGy in 25 fractions out of a planned dose of 7000 cGy in 28 fractions.     Today on exam the patient is tolerating radiation therapy well and has no new disease or treatment-related complaints.  No need for Depends.  No hematuria.  Stable nocturia.                                           Review of Systems:   Review of Systems   Constitutional:  Negative for appetite change, chills, fatigue, fever and unexpected weight change.   Respiratory:  Positive for cough (occasional). Negative for chest tightness and shortness of breath.    Gastrointestinal:  Positive for constipation (Drinks prune juice as needed, last bm yesterday.). Negative for blood in stool, diarrhea, nausea, rectal pain and vomiting.   Genitourinary:  Positive for frequency (Occasional, ongoing) and urgency (Occasional, ongoing). Negative for decreased urine volume, difficulty urinating, dysuria and hematuria.        Nocturia x 0-1  Occasionally weaker stream, ongoing  Occasional leakage noted with urgency, ongoing   Musculoskeletal:  Negative for arthralgias and back pain.   Skin:  Negative for rash.   Neurological:  Negative for dizziness, weakness and headaches.   Psychiatric/Behavioral:  Positive for sleep disturbance (Nocturia x 1).        Vitals:     Vitals:    08/01/24 1136   BP: 156/81   Pulse: 68   Resp: 18   Temp: 97.7 °F (36.5 °C)   SpO2: 98%       Weight:   Wt Readings from Last 3 Encounters:   08/01/24 77.7 kg (171 lb 4.8 oz)   07/23/24 77.6 kg (171 lb 1.2 oz)   07/16/24 76 kg (167 lb 8.8 oz)      Pain:    Pain Score    08/01/24 1136   PainSc: 0-No pain         Physical Exam:  Gen: WD/WN; NAD  HEENT:  MMM  Trachea: midline  Chest: symmetric  Resp: normal respiratory effort  Extr: warm, well-perfused  Neuro: awake and alert; no aphasia or neglect    Plan: I have reviewed treatment setup notes, checked and approved the daily guidance images.  I reviewed dose delivery, treatment parameters and deemed them appropriate.     Patient's daughter had some concerns about progression of his dementia following anesthesia for spaceOAR, such as seeing  family members.  She was aware it was local anesthesia.  She and her mother reported that when the patient was home over the past weekend, he was back to his old self.  The patient has been staying at his daughter's home during his course of radiation.  I explained he may need to be back at home for a couple of weeks to see him return to baseline.  Any change in environment can make dementia symptoms worse.    We plan to continue radiation therapy as prescribed.    Radiation Oncology    Electronically signed by Cayla Andrdae MD 2024  11:56 EDT

## 2024-08-07 ENCOUNTER — HOSPITAL ENCOUNTER (OUTPATIENT)
Dept: RADIATION ONCOLOGY | Facility: HOSPITAL | Age: 74
Discharge: HOME | End: 2024-08-07

## 2024-08-07 VITALS
TEMPERATURE: 98.3 F | OXYGEN SATURATION: 100 % | SYSTOLIC BLOOD PRESSURE: 154 MMHG | HEART RATE: 67 BPM | BODY MASS INDEX: 23.08 KG/M2 | RESPIRATION RATE: 18 BRPM | DIASTOLIC BLOOD PRESSURE: 94 MMHG | WEIGHT: 170.19 LBS

## 2024-08-07 DIAGNOSIS — C61 PROSTATE CANCER: Primary | ICD-10-CM

## 2024-08-07 LAB
RAD ONC ARIA COURSE ID: NORMAL
RAD ONC ARIA COURSE INTENT: NORMAL
RAD ONC ARIA COURSE LAST TREATMENT DATE: NORMAL
RAD ONC ARIA COURSE START DATE: NORMAL
RAD ONC ARIA COURSE TREATMENT ELAPSED DAYS: 44
RAD ONC ARIA FIRST TREATMENT DATE: NORMAL
RAD ONC ARIA PLAN FRACTIONS TREATED TO DATE: 26
RAD ONC ARIA PLAN ID: NORMAL
RAD ONC ARIA PLAN PRESCRIBED DOSE PER FRACTION: 2.5 GY
RAD ONC ARIA PLAN PRIMARY REFERENCE POINT: NORMAL
RAD ONC ARIA PLAN TOTAL FRACTIONS PRESCRIBED: 28
RAD ONC ARIA PLAN TOTAL PRESCRIBED DOSE: 7000 CGY
RAD ONC ARIA REFERENCE POINT DOSAGE GIVEN TO DATE: 65 GY
RAD ONC ARIA REFERENCE POINT ID: NORMAL
RAD ONC ARIA REFERENCE POINT SESSION DOSAGE GIVEN: 2.5 GY

## 2024-08-07 PROCEDURE — 77385: CPT | Performed by: RADIOLOGY

## 2024-08-07 PROCEDURE — 77014 CHG CT GUIDANCE RADIATION THERAPY FLDS PLACEMENT: CPT | Performed by: RADIOLOGY

## 2024-08-07 PROCEDURE — 77427 RADIATION TX MANAGEMENT X5: CPT | Performed by: RADIOLOGY

## 2024-08-07 NOTE — PROGRESS NOTES
On Treatment Visit       Patient: Fausto Zaman   YOB: 1950   Medical Record Number: [id number]     Date of Visit  August 7, 2024   Primary Diagnosis:No primary diagnosis found.       was seen today for an on treatment visit.  He is receiving radiation therapy to the pelvis/prostate. He  has received 6500 cGy in 26 fractions out of a planned dose of 7000 cGy in 28 fractions.     Today on exam the patient is tolerating radiation therapy well and has no new disease or treatment-related complaints.      They enjoyed a long weekend at home    Review of Systems:   Review of Systems   Constitutional:  Negative for appetite change and fatigue.   Respiratory:  Negative for cough, chest tightness and shortness of breath.    Gastrointestinal:  Positive for constipation (Drinks prune juice as needed, last bm yesterday.). Negative for blood in stool, diarrhea, nausea, rectal pain and vomiting.   Genitourinary:  Positive for frequency (Occasional, ongoing) and urgency (Occasional, ongoing). Negative for decreased urine volume, difficulty urinating, dysuria and hematuria.        Nocturia x 0-1  Occasionally weaker stream, ongoing  Occasional leakage noted with urgency, ongoing   Musculoskeletal:  Negative for arthralgias and back pain.   Skin:  Negative for rash.   Neurological:  Negative for dizziness, weakness and headaches.   Psychiatric/Behavioral:  Positive for sleep disturbance (Nocturia x 1).        Vitals:     Vitals:    08/07/24 1226   BP: 154/94   Pulse: 67   Resp: 18   Temp: 98.3 °F (36.8 °C)   SpO2: 100%       Weight:   Wt Readings from Last 3 Encounters:   08/07/24 77.2 kg (170 lb 3.1 oz)   08/01/24 77.7 kg (171 lb 4.8 oz)   07/23/24 77.6 kg (171 lb 1.2 oz)      Pain:    Pain Score    08/07/24 1226   PainSc: 0-No pain         Physical Exam:  Gen: WD/WN; NAD  HEENT: MMM  Trachea: midline  Chest: symmetric  Resp: normal respiratory effort  Extr: warm, well-perfused  Neuro: awake and alert;  no aphasia or neglect    Plan: I have reviewed treatment setup notes, checked and approved the daily guidance images.  I reviewed dose delivery, treatment parameters and deemed them appropriate.     We plan to continue radiation therapy as prescribed.    Radiation Oncology    Electronically signed by Cayla Andrade MD 8/7/2024  12:30 EDT

## 2024-08-08 ENCOUNTER — HOSPITAL ENCOUNTER (OUTPATIENT)
Dept: RADIATION ONCOLOGY | Facility: HOSPITAL | Age: 74
Discharge: HOME | End: 2024-08-08

## 2024-08-08 LAB
RAD ONC ARIA COURSE ID: NORMAL
RAD ONC ARIA COURSE INTENT: NORMAL
RAD ONC ARIA COURSE LAST TREATMENT DATE: NORMAL
RAD ONC ARIA COURSE START DATE: NORMAL
RAD ONC ARIA COURSE TREATMENT ELAPSED DAYS: 45
RAD ONC ARIA FIRST TREATMENT DATE: NORMAL
RAD ONC ARIA PLAN FRACTIONS TREATED TO DATE: 27
RAD ONC ARIA PLAN ID: NORMAL
RAD ONC ARIA PLAN PRESCRIBED DOSE PER FRACTION: 2.5 GY
RAD ONC ARIA PLAN PRIMARY REFERENCE POINT: NORMAL
RAD ONC ARIA PLAN TOTAL FRACTIONS PRESCRIBED: 28
RAD ONC ARIA PLAN TOTAL PRESCRIBED DOSE: 7000 CGY
RAD ONC ARIA REFERENCE POINT DOSAGE GIVEN TO DATE: 67.5 GY
RAD ONC ARIA REFERENCE POINT ID: NORMAL
RAD ONC ARIA REFERENCE POINT SESSION DOSAGE GIVEN: 2.5 GY

## 2024-08-08 PROCEDURE — 77385: CPT | Performed by: RADIOLOGY

## 2024-08-08 PROCEDURE — 77014 CHG CT GUIDANCE RADIATION THERAPY FLDS PLACEMENT: CPT | Performed by: RADIOLOGY

## 2024-08-09 ENCOUNTER — HOSPITAL ENCOUNTER (OUTPATIENT)
Dept: RADIATION ONCOLOGY | Facility: HOSPITAL | Age: 74
Discharge: HOME | End: 2024-08-09

## 2024-08-09 LAB
RAD ONC ARIA COURSE ID: NORMAL
RAD ONC ARIA COURSE INTENT: NORMAL
RAD ONC ARIA COURSE LAST TREATMENT DATE: NORMAL
RAD ONC ARIA COURSE START DATE: NORMAL
RAD ONC ARIA COURSE TREATMENT ELAPSED DAYS: 46
RAD ONC ARIA FIRST TREATMENT DATE: NORMAL
RAD ONC ARIA PLAN FRACTIONS TREATED TO DATE: 28
RAD ONC ARIA PLAN ID: NORMAL
RAD ONC ARIA PLAN PRESCRIBED DOSE PER FRACTION: 2.5 GY
RAD ONC ARIA PLAN PRIMARY REFERENCE POINT: NORMAL
RAD ONC ARIA PLAN TOTAL FRACTIONS PRESCRIBED: 28
RAD ONC ARIA PLAN TOTAL PRESCRIBED DOSE: 7000 CGY
RAD ONC ARIA REFERENCE POINT DOSAGE GIVEN TO DATE: 70 GY
RAD ONC ARIA REFERENCE POINT ID: NORMAL
RAD ONC ARIA REFERENCE POINT SESSION DOSAGE GIVEN: 2.5 GY

## 2024-08-09 PROCEDURE — 77385: CPT | Performed by: RADIOLOGY

## 2024-08-09 PROCEDURE — 77336 RADIATION PHYSICS CONSULT: CPT | Performed by: RADIOLOGY

## 2024-08-09 PROCEDURE — 77014 CHG CT GUIDANCE RADIATION THERAPY FLDS PLACEMENT: CPT | Performed by: RADIOLOGY

## 2024-08-13 DIAGNOSIS — C61 MALIGNANT NEOPLASM OF PROSTATE: Primary | ICD-10-CM

## 2024-08-27 ENCOUNTER — PATIENT ROUNDING (BHMG ONLY) (OUTPATIENT)
Dept: RADIATION ONCOLOGY | Facility: HOSPITAL | Age: 74
End: 2024-08-27
Payer: MEDICARE

## 2024-08-27 NOTE — PROGRESS NOTES
"Patient completed radiation treatment for prostate cancer on 8/9/24. Following up with patient regarding any concerns the patient may have at this time and receiving feedback in regards to patient over all care while under treatment.     Patient asked about symptoms and side effects that continue to be bothersome. Mr. Zaman denied any bothersome symptoms. States he \"feels good.\"    Patient denies pain. Denies any urinary pain or trouble with urination. States he has some fatigue at times. No medication changes. No refills requested.     Patient was asked if there was anything that could've made their experience better at [facility] while they were under treatment. Patient states: \"you guys were wonderful. We really appreciate the certificate and we think it is a good hospital.\"    Patient encouraged to call the office if any concerns arise. Patient reminded of follow up appointment on 9/24/24 with Suma Rodrigues at 130.  "

## 2024-09-24 ENCOUNTER — OFFICE VISIT (OUTPATIENT)
Dept: RADIATION ONCOLOGY | Facility: HOSPITAL | Age: 74
End: 2024-09-24
Payer: MEDICARE

## 2024-09-24 VITALS
OXYGEN SATURATION: 98 % | BODY MASS INDEX: 22.78 KG/M2 | RESPIRATION RATE: 16 BRPM | TEMPERATURE: 98.4 F | SYSTOLIC BLOOD PRESSURE: 144 MMHG | DIASTOLIC BLOOD PRESSURE: 77 MMHG | WEIGHT: 167.99 LBS | HEART RATE: 65 BPM

## 2024-09-24 DIAGNOSIS — Z92.3 STATUS POST RADIATION THERAPY WITHIN FOUR TO TWELVE WEEKS: ICD-10-CM

## 2024-09-24 DIAGNOSIS — Z08 ENCOUNTER FOR FOLLOW-UP EXAMINATION AFTER COMPLETED TREATMENT FOR MALIGNANT NEOPLASM: ICD-10-CM

## 2024-09-24 DIAGNOSIS — C61 MALIGNANT NEOPLASM OF PROSTATE: Primary | ICD-10-CM

## 2024-09-24 LAB — PSA SERPL-MCNC: 3.13 NG/ML (ref 0–4)

## 2024-09-24 PROCEDURE — 84153 ASSAY OF PSA TOTAL: CPT | Performed by: NURSE PRACTITIONER

## 2024-09-24 PROCEDURE — 36415 COLL VENOUS BLD VENIPUNCTURE: CPT | Performed by: NURSE PRACTITIONER

## 2024-09-24 PROCEDURE — G0463 HOSPITAL OUTPT CLINIC VISIT: HCPCS | Performed by: NURSE PRACTITIONER

## 2025-03-07 ENCOUNTER — OFFICE VISIT (OUTPATIENT)
Dept: RADIATION ONCOLOGY | Facility: HOSPITAL | Age: 75
End: 2025-03-07
Payer: MEDICARE

## 2025-03-07 VITALS
HEART RATE: 83 BPM | RESPIRATION RATE: 12 BRPM | SYSTOLIC BLOOD PRESSURE: 131 MMHG | BODY MASS INDEX: 24.85 KG/M2 | TEMPERATURE: 97.5 F | WEIGHT: 183.2 LBS | DIASTOLIC BLOOD PRESSURE: 91 MMHG | OXYGEN SATURATION: 96 %

## 2025-03-07 DIAGNOSIS — Z85.46 ENCOUNTER FOR FOLLOW-UP SURVEILLANCE OF PROSTATE CANCER: ICD-10-CM

## 2025-03-07 DIAGNOSIS — Z92.3 HISTORY OF EXTERNAL BEAM RADIATION THERAPY: ICD-10-CM

## 2025-03-07 DIAGNOSIS — C61 MALIGNANT NEOPLASM OF PROSTATE: Primary | ICD-10-CM

## 2025-03-07 DIAGNOSIS — Z08 ENCOUNTER FOR FOLLOW-UP SURVEILLANCE OF PROSTATE CANCER: ICD-10-CM

## 2025-03-07 LAB — PSA SERPL-MCNC: 1.23 NG/ML (ref 0–4)

## 2025-03-07 PROCEDURE — G0463 HOSPITAL OUTPT CLINIC VISIT: HCPCS | Performed by: NURSE PRACTITIONER

## 2025-03-07 PROCEDURE — 36415 COLL VENOUS BLD VENIPUNCTURE: CPT | Performed by: NURSE PRACTITIONER

## 2025-03-07 PROCEDURE — 84153 ASSAY OF PSA TOTAL: CPT | Performed by: NURSE PRACTITIONER

## 2025-03-07 NOTE — PROGRESS NOTES
Follow Up Office Visit      Encounter Date: 03/07/2025   Patient Name: Fausto Zaman  YOB: 1950   Medical Record Number: [id number]   Primary Diagnosis: Malignant neoplasm of prostate [C61]     Cancer Staging   Malignant neoplasm of prostate  Staging form: Prostate, AJCC 8th Edition  - Clinical stage from 12/18/2023: Stage IIB (cT1c, cN0, cM0, PSA: 7.6, Grade Group: 2) - Signed by Suma Rodrigues APRN on 9/23/2024    Radiation Completion Date:  8/9/2024     Chief Complaint:    Chief Complaint   Patient presents with    Follow-up    Prostate Cancer       Oncology/Hematology History   Malignant neoplasm of prostate   12/18/2023 Cancer Staged    Staging form: Prostate, AJCC 8th Edition  - Clinical stage from 12/18/2023: Stage IIB (cT1c, cN0, cM0, PSA: 7.6, Grade Group: 2) - Signed by Suma Rodrigues APRN on 9/23/2024 5/30/2024 Procedure    Perirectal spacer application for radiation treatment with Dr. Mejia.      6/17/2024 Initial Diagnosis    Malignant neoplasm of prostate     6/24/2024 - 8/9/2024 Radiation    Radiation OncologyTreatment Course:  Fausto Zaman received 7000 cGy in 28 fractions to the prostate and seminal vesicles.          History of Present Illness: Fasuto Zaman is a 74 y.o. male who returns to [facility] for routine follow-up of his prostate cancer. He is accompanied by his wife and daughter.   History of Present Illness  He reports feeling well overall with no new complaints or concerns. He denies any significant changes in his urinary function since prior visit. He has no urinary complaints today denying dysuria, hematuria, frequency, weak stream, incomplete emptying or incontinence. He occasionally experiences urinary urgency related to his fluid consumption. He also reports infrequent episodes of small-volume urinary leakage, typically occurring when he is unable to reach the bathroom in time. Nocturia 0-1x/night. He is not actively followed by  Urology; last visit was in February 2024. He did not receive ADT at any point. He reports no alterations in bowel habits, diarrhea, or hematochezia. He experiences mild constipation approximately once every 1 to 2 weeks, which typically resolves spontaneously. He manages this by increasing his intake of apple juice or prune juice.    Subjective      Review of Systems: Review of Systems   Constitutional:  Positive for unexpected weight change (up 15lbs in the last 5 months.). Negative for appetite change, chills, fatigue and fever.   HENT:  Negative for hearing loss, sore throat and trouble swallowing.    Eyes:  Negative for visual disturbance.   Respiratory:  Positive for cough (Occasional, non productive.). Negative for chest tightness, shortness of breath and wheezing.    Cardiovascular:  Negative for chest pain, palpitations and leg swelling.   Gastrointestinal:  Positive for diarrhea (Occasionally, ongoing.). Negative for abdominal distention, abdominal pain, blood in stool, constipation, nausea, rectal pain and vomiting.   Genitourinary:  Positive for urgency (Occasional, ongoing). Negative for decreased urine volume, difficulty urinating, dysuria, frequency and hematuria.        Nocturia x 0-2  Normal stream  Occasional leakage, noted with urgency, ongoing   Musculoskeletal:  Negative for arthralgias, back pain, gait problem and joint swelling.   Skin:  Negative for color change and rash.   Neurological:  Negative for dizziness, tremors, seizures, syncope, speech difficulty, weakness, numbness and headaches.   Psychiatric/Behavioral:  Positive for confusion (Ongoing, occasionally worse in afternoons/nights). Negative for sleep disturbance.        The following portions of the patient's history were reviewed and updated as appropriate: allergies, current medications, past family history, past medical history, past social history, past surgical history and problem list.    Medications:     Current Outpatient  Medications:     B Complex Vitamins (VITAMIN-B COMPLEX PO), Take 1 tablet by mouth Daily., Disp: , Rfl:     Cholecalciferol (Vitamin D-3) 125 MCG (5000 UT) tablet, TAKE 2 TABLETS BY MOUTH ONCE DAILY FOR 90 DAYS, Disp: , Rfl:     Cyanocobalamin 1000 MCG sublingual tablet, Place 1 tablet under the tongue Daily., Disp: , Rfl:     memantine (NAMENDA) 10 MG tablet, Take 1 tablet by mouth Daily., Disp: , Rfl:     Allergies:   No Known Allergies    Patient Smoking History:   Social History     Tobacco Use   Smoking Status Never   Smokeless Tobacco Never       Measures:  PHQ-9 Total Score: 0   Quality of Life: 90 - Limited Activity   ECOG score: 1  ECOG: (1) Restricted in physically strenuous activity, ambulatory and able to do work of light nature  Pain: (on a scale of 0-10)   Pain Score    03/07/25 1500   PainSc: 0-No pain       Objective     Physical Exam:   Vital Signs:   Vitals:    03/07/25 1500   BP: 131/91   Pulse: 83   Resp: 12   Temp: 97.5 °F (36.4 °C)   TempSrc: Oral   SpO2: 96%   Weight: 83.1 kg (183 lb 3.2 oz)   PainSc: 0-No pain     Body mass index is 24.85 kg/m².   Wt Readings from Last 3 Encounters:   03/07/25 83.1 kg (183 lb 3.2 oz)   09/24/24 76.2 kg (167 lb 15.9 oz)   08/07/24 77.2 kg (170 lb 3.1 oz)       Physical Exam  Vitals reviewed.   Constitutional:       General: He is not in acute distress.     Appearance: Normal appearance. He is normal weight. He is not ill-appearing.   Eyes:      Conjunctiva/sclera: Conjunctivae normal.      Pupils: Pupils are equal, round, and reactive to light.   Pulmonary:      Effort: Pulmonary effort is normal. No respiratory distress.   Musculoskeletal:         General: Normal range of motion.      Cervical back: Normal range of motion.   Skin:     General: Skin is warm and dry.   Neurological:      General: No focal deficit present.      Mental Status: He is alert and oriented to person, place, and time.   Psychiatric:         Mood and Affect: Mood normal.          "Behavior: Behavior normal.       Result Review: I independently reviewed the following data.   -- PSA Diagnostic (03/07/2025 15:14)   Results      Pathology: No results found for: \"CLININFO\", \"FINALDX\", \"SYNOPTIC\"    Imaging: No radiology results for the last 90 days.     Labs:   Creatinine   Date Value Ref Range Status   06/17/2024 1.20 0.60 - 1.30 mg/dL Final     Comment:     Serial Number: [id number]:  [id number]     eGFR   Date Value Ref Range Status   06/17/2024 63.1 >60.0 mL/min/1.73 Final      PSA   Date Value Ref Range Status   03/07/2025 1.230 0.000 - 4.000 ng/mL Final   09/24/2024 3.130 0.000 - 4.000 ng/mL Final     Outside facility PSA results:  PSA on 10/19/2023:7.6 ng/mL  PSA on 6/15/2023:6.3 ng/mL    Assessment / Plan      Impression: Fausto Zaman is a pleasant 74 y.o. male with cT1c cN0 cM0 adenocarcinoma of the prostate, grade 2, Phyllis 3+4= 7, iPSA 7.6 ng/mL. He has no radiographic evidence of regional or distant metastatic disease per staging CT CAP and Bone Scan on 2/2/2024. He did not receive ADT at any point. He is status post external beam radiotherapy to the prostate and seminal vesicles, completed on 8/9/2024. Received 7000 cGy in 28 fractions. He is clinically doing well overall with essentially unchanged urinary function. AUA Symptom Score today of 5. He is not on any urological medications. Awaiting results of PSA obtained today. Will continue to monitor his PSA trend as well as his urinary function.     Assessment/Plan:   Diagnoses and all orders for this visit:    1. Malignant neoplasm of prostate (Primary)  -     PSA Diagnostic  -     PSA Diagnostic; Future    2. History of external beam radiation therapy    3. Encounter for follow-up surveillance of prostate cancer       Follow Up:   Awaiting results of PSA. Will contact him with results.   Return for follow-up in 6 months with PSA prior.   Follow-up with [facility] as needed.      ADDENDUM on 3/10/25: his PSA on " 3/7/25 is 1.23 ng/mL and is trending down nicely indicating a positive response to radiotherapy.     Return in about 6 months (around 9/7/2025) for Office Visit.  Fausto Zaman was encouraged to contact me in the interim with any questions or concerns regarding his care.      MCKENNA Lyons  Radiation Oncology  [facility]    This document has been signed by MCKENNA Dc on March 10, 2025 13:08 EDT     Patient or patient representative verbalized consent for the use of Ambient Listening during the visit with  MCKENNA Dc for chart documentation. 3/10/2025  15:11 EST

## (undated) DEVICE — SHEET,DRAPE,70X85,STERILE: Brand: MEDLINE

## (undated) DEVICE — DRSNG SURESITE123 8X12IN

## (undated) DEVICE — GLV SURG SENSICARE W/ALOE PF LF 6.5 STRL

## (undated) DEVICE — SKIN PREP TRAY W/CHG: Brand: MEDLINE INDUSTRIES, INC.

## (undated) DEVICE — PAD,SANITARY,11 IN,MAXI,N-STRL,IND WRAP: Brand: MEDLINE